# Patient Record
Sex: FEMALE | Race: WHITE | NOT HISPANIC OR LATINO | Employment: OTHER | URBAN - METROPOLITAN AREA
[De-identification: names, ages, dates, MRNs, and addresses within clinical notes are randomized per-mention and may not be internally consistent; named-entity substitution may affect disease eponyms.]

---

## 2023-06-02 ENCOUNTER — OFFICE VISIT (OUTPATIENT)
Dept: PRIMARY CARE | Facility: CLINIC | Age: 78
End: 2023-06-02
Payer: MEDICARE

## 2023-06-02 ENCOUNTER — LAB (OUTPATIENT)
Dept: LAB | Facility: LAB | Age: 78
End: 2023-06-02
Payer: MEDICARE

## 2023-06-02 VITALS
WEIGHT: 185.8 LBS | TEMPERATURE: 98 F | BODY MASS INDEX: 31.89 KG/M2 | DIASTOLIC BLOOD PRESSURE: 82 MMHG | SYSTOLIC BLOOD PRESSURE: 132 MMHG | HEART RATE: 76 BPM

## 2023-06-02 DIAGNOSIS — R60.0 BILATERAL LOWER EXTREMITY EDEMA: Primary | ICD-10-CM

## 2023-06-02 DIAGNOSIS — R60.0 BILATERAL LOWER EXTREMITY EDEMA: ICD-10-CM

## 2023-06-02 PROBLEM — N39.0 RECURRENT URINARY TRACT INFECTION: Status: ACTIVE | Noted: 2023-06-02

## 2023-06-02 PROBLEM — R26.89 BALANCE PROBLEMS: Status: ACTIVE | Noted: 2023-06-02

## 2023-06-02 PROBLEM — G89.29 CHRONIC MIDLINE LOW BACK PAIN WITHOUT SCIATICA: Status: ACTIVE | Noted: 2023-06-02

## 2023-06-02 PROBLEM — N34.2 ATROPHIC URETHRITIS: Status: ACTIVE | Noted: 2023-06-02

## 2023-06-02 PROBLEM — H90.3 ASYMMETRICAL SENSORINEURAL HEARING LOSS: Status: ACTIVE | Noted: 2023-06-02

## 2023-06-02 PROBLEM — N32.81 OAB (OVERACTIVE BLADDER): Status: ACTIVE | Noted: 2023-06-02

## 2023-06-02 PROBLEM — E03.9 HYPOTHYROIDISM (ACQUIRED): Status: ACTIVE | Noted: 2023-06-02

## 2023-06-02 PROBLEM — M85.80 OSTEOPENIA: Status: ACTIVE | Noted: 2023-06-02

## 2023-06-02 PROBLEM — E78.5 HYPERLIPIDEMIA: Status: ACTIVE | Noted: 2023-06-02

## 2023-06-02 PROBLEM — K21.00 GASTROESOPHAGEAL REFLUX DISEASE WITH ESOPHAGITIS: Status: ACTIVE | Noted: 2023-06-02

## 2023-06-02 PROBLEM — M54.50 CHRONIC MIDLINE LOW BACK PAIN WITHOUT SCIATICA: Status: ACTIVE | Noted: 2023-06-02

## 2023-06-02 PROBLEM — N95.2 VAGINAL ATROPHY: Status: ACTIVE | Noted: 2023-06-02

## 2023-06-02 PROBLEM — F32.A CHRONIC DEPRESSION: Status: ACTIVE | Noted: 2023-06-02

## 2023-06-02 LAB
ALANINE AMINOTRANSFERASE (SGPT) (U/L) IN SER/PLAS: 11 U/L (ref 7–45)
ALBUMIN (G/DL) IN SER/PLAS: 3.8 G/DL (ref 3.4–5)
ALKALINE PHOSPHATASE (U/L) IN SER/PLAS: 75 U/L (ref 33–136)
ANION GAP IN SER/PLAS: 12 MMOL/L (ref 10–20)
ASPARTATE AMINOTRANSFERASE (SGOT) (U/L) IN SER/PLAS: 12 U/L (ref 9–39)
BILIRUBIN TOTAL (MG/DL) IN SER/PLAS: 0.5 MG/DL (ref 0–1.2)
CALCIUM (MG/DL) IN SER/PLAS: 8.7 MG/DL (ref 8.6–10.3)
CARBON DIOXIDE, TOTAL (MMOL/L) IN SER/PLAS: 25 MMOL/L (ref 21–32)
CHLORIDE (MMOL/L) IN SER/PLAS: 107 MMOL/L (ref 98–107)
CREATININE (MG/DL) IN SER/PLAS: 0.93 MG/DL (ref 0.5–1.05)
ERYTHROCYTE DISTRIBUTION WIDTH (RATIO) BY AUTOMATED COUNT: 13.2 % (ref 11.5–14.5)
ERYTHROCYTE MEAN CORPUSCULAR HEMOGLOBIN CONCENTRATION (G/DL) BY AUTOMATED: 31.8 G/DL (ref 32–36)
ERYTHROCYTE MEAN CORPUSCULAR VOLUME (FL) BY AUTOMATED COUNT: 93 FL (ref 80–100)
ERYTHROCYTES (10*6/UL) IN BLOOD BY AUTOMATED COUNT: 4.12 X10E12/L (ref 4–5.2)
GFR FEMALE: 62 ML/MIN/1.73M2
GLUCOSE (MG/DL) IN SER/PLAS: 115 MG/DL (ref 74–99)
HEMATOCRIT (%) IN BLOOD BY AUTOMATED COUNT: 38.4 % (ref 36–46)
HEMOGLOBIN (G/DL) IN BLOOD: 12.2 G/DL (ref 12–16)
LEUKOCYTES (10*3/UL) IN BLOOD BY AUTOMATED COUNT: 7.8 X10E9/L (ref 4.4–11.3)
PLATELETS (10*3/UL) IN BLOOD AUTOMATED COUNT: 278 X10E9/L (ref 150–450)
POTASSIUM (MMOL/L) IN SER/PLAS: 3.7 MMOL/L (ref 3.5–5.3)
PROTEIN TOTAL: 6.3 G/DL (ref 6.4–8.2)
SODIUM (MMOL/L) IN SER/PLAS: 140 MMOL/L (ref 136–145)
THYROTROPIN (MIU/L) IN SER/PLAS BY DETECTION LIMIT <= 0.05 MIU/L: 1.85 MIU/L (ref 0.44–3.98)
UREA NITROGEN (MG/DL) IN SER/PLAS: 15 MG/DL (ref 6–23)

## 2023-06-02 PROCEDURE — 1160F RVW MEDS BY RX/DR IN RCRD: CPT | Performed by: NURSE PRACTITIONER

## 2023-06-02 PROCEDURE — 99214 OFFICE O/P EST MOD 30 MIN: CPT | Performed by: NURSE PRACTITIONER

## 2023-06-02 PROCEDURE — 1159F MED LIST DOCD IN RCRD: CPT | Performed by: NURSE PRACTITIONER

## 2023-06-02 PROCEDURE — 36415 COLL VENOUS BLD VENIPUNCTURE: CPT

## 2023-06-02 PROCEDURE — 80053 COMPREHEN METABOLIC PANEL: CPT

## 2023-06-02 PROCEDURE — 1036F TOBACCO NON-USER: CPT | Performed by: NURSE PRACTITIONER

## 2023-06-02 PROCEDURE — 84443 ASSAY THYROID STIM HORMONE: CPT

## 2023-06-02 PROCEDURE — 85027 COMPLETE CBC AUTOMATED: CPT

## 2023-06-02 RX ORDER — ESTRADIOL 0.1 MG/G
CREAM VAGINAL
COMMUNITY

## 2023-06-02 RX ORDER — FLUOXETINE HYDROCHLORIDE 20 MG/1
40 CAPSULE ORAL DAILY
COMMUNITY
End: 2024-02-22

## 2023-06-02 RX ORDER — SOLIFENACIN SUCCINATE 5 MG/1
5 TABLET, FILM COATED ORAL DAILY
COMMUNITY
End: 2023-11-29 | Stop reason: SDUPTHER

## 2023-06-02 RX ORDER — SULFAMETHOXAZOLE AND TRIMETHOPRIM 400; 80 MG/1; MG/1
1 TABLET ORAL DAILY
COMMUNITY
End: 2024-03-28

## 2023-06-02 RX ORDER — LEVOTHYROXINE SODIUM 50 UG/1
50 TABLET ORAL DAILY
COMMUNITY
End: 2024-01-22

## 2023-06-02 RX ORDER — FLUTICASONE PROPIONATE 50 MCG
SPRAY, SUSPENSION (ML) NASAL
COMMUNITY

## 2023-06-02 RX ORDER — ATORVASTATIN CALCIUM 20 MG/1
20 TABLET, FILM COATED ORAL NIGHTLY
COMMUNITY
End: 2023-07-13

## 2023-06-02 RX ORDER — ACETAMINOPHEN 500 MG
1 TABLET ORAL DAILY
COMMUNITY
Start: 2014-03-31

## 2023-06-02 RX ORDER — ALENDRONATE SODIUM 70 MG/1
70 TABLET ORAL
COMMUNITY
End: 2023-08-11

## 2023-06-02 RX ORDER — MINERAL OIL
ENEMA (ML) RECTAL
COMMUNITY
Start: 2015-08-17

## 2023-06-02 RX ORDER — LANOLIN ALCOHOL/MO/W.PET/CERES
CREAM (GRAM) TOPICAL
COMMUNITY
Start: 2019-09-06

## 2023-06-02 RX ORDER — DEXLANSOPRAZOLE 60 MG/1
60 CAPSULE, DELAYED RELEASE ORAL 3 TIMES WEEKLY
COMMUNITY
End: 2024-03-25 | Stop reason: SDUPTHER

## 2023-06-02 RX ORDER — GLUCOSAM/CHON-MSM1/C/MANG/BOSW 750-644 MG
1 TABLET ORAL DAILY
COMMUNITY
Start: 2015-11-18

## 2023-06-02 RX ORDER — ERYTHROMYCIN 5 MG/G
OINTMENT OPHTHALMIC
COMMUNITY
Start: 2021-12-03

## 2023-06-02 ASSESSMENT — ENCOUNTER SYMPTOMS
ABDOMINAL PAIN: 0
FEVER: 0
FATIGUE: 0
WHEEZING: 0
COUGH: 1
SHORTNESS OF BREATH: 0
ABDOMINAL DISTENTION: 0
PALPITATIONS: 0
CHILLS: 0
CHEST TIGHTNESS: 0

## 2023-06-02 NOTE — ASSESSMENT & PLAN NOTE
Suspect due to venous insufficiency.  Check labs today.  Recommended wearing compression socks.  Continue to elevate throughout the day.  Continue to hydrate with water and focus on a low-sodium diet.  Given slow increase in weight, an echo was ordered today.

## 2023-06-02 NOTE — PATIENT INSTRUCTIONS
Continue to focus on a low sodium diet.  Be sure to stay hydrated with water.  Obtain the blood work as ordered today.  Elevate legs for 30-60 mins about 4 times a day.   Echo ordered.  If weight increases 2 lbs in a day or 5 lbs in a week, notify the office.

## 2023-06-02 NOTE — PROGRESS NOTES
Subjective   Patient ID: Valery Kiser is a 77 y.o. female who presents for Leg Swelling (About a month but the last week it has gotten worse. Swelling is worse at the end of the day.).    HPI  She presents to the office today with her  for evaluation of edema to BLE. This has been present for about a month but worse in the past week. This seems to be worse at the end of the day- better in the AM.  Elevates during the day several times.  It is only affecting the ankles and feet.  No chest pain, SOB or palpitations.  No SOB when laying flat at night. No CASAS.  (+) cough at night due to PND.  Appetite is good.  No early satiety.  No bloating.  Overall she is feeling well.  No fever or chills.   She reports she drinks plenty of water and focus on a low-sodium diet.  Has had laser surgery to varicose veins in the past both legs (2008).  Had similar swelling prior to having the procedure.    Review of Systems   Constitutional:  Negative for chills, fatigue and fever.   HENT:  Positive for postnasal drip.    Respiratory:  Positive for cough. Negative for chest tightness, shortness of breath and wheezing.    Cardiovascular:  Positive for leg swelling. Negative for chest pain and palpitations.   Gastrointestinal:  Negative for abdominal distention and abdominal pain.       Objective   /82   Pulse 76   Temp 36.7 °C (98 °F)   Wt 84.3 kg (185 lb 12.8 oz)   BMI 31.89 kg/m²     Physical Exam  Constitutional:       General: She is not in acute distress.     Appearance: Normal appearance. She is not toxic-appearing.   Eyes:      Extraocular Movements: Extraocular movements intact.      Conjunctiva/sclera: Conjunctivae normal.      Pupils: Pupils are equal, round, and reactive to light.   Cardiovascular:      Rate and Rhythm: Normal rate and regular rhythm.      Pulses: Normal pulses.      Heart sounds: Normal heart sounds, S1 normal and S2 normal. No murmur heard.  Pulmonary:      Effort: Pulmonary effort is  normal. No respiratory distress.      Breath sounds: Normal breath sounds.   Abdominal:      General: Bowel sounds are normal.      Palpations: Abdomen is soft.      Tenderness: There is no abdominal tenderness.   Musculoskeletal:      Right lower le+ Pitting Edema present.      Left lower le+ Pitting Edema present.   Lymphadenopathy:      Cervical: No cervical adenopathy.   Neurological:      Mental Status: She is alert and oriented to person, place, and time.   Psychiatric:         Attention and Perception: Attention normal.         Mood and Affect: Mood and affect normal.         Behavior: Behavior normal. Behavior is cooperative.         Thought Content: Thought content normal.         Cognition and Memory: Cognition normal.         Judgment: Judgment normal.         Assessment/Plan   Problem List Items Addressed This Visit          Musculoskeletal    Bilateral lower extremity edema - Primary     Check labs today.  Recommended wearing compression socks.  Continue to elevate throughout the day.  Continue to hydrate with water and focus on a low-sodium diet.  Given slow increase in weight, an echo was ordered today.         Relevant Orders    Comprehensive Metabolic Panel    CBC    TSH with reflex to Free T4 if abnormal    Transthoracic Echo (TTE) Complete            It has been a pleasure seeing you today!

## 2023-06-05 ENCOUNTER — TELEPHONE (OUTPATIENT)
Dept: PRIMARY CARE | Facility: CLINIC | Age: 78
End: 2023-06-05
Payer: MEDICARE

## 2023-06-05 NOTE — TELEPHONE ENCOUNTER
----- Message from HALLIE Balderrama sent at 6/3/2023  9:21 AM EDT -----  Please let her know her labs looked good. Has there been any improvement in the swelling of her lower legs?

## 2023-06-05 NOTE — TELEPHONE ENCOUNTER
Per HIPAA, relayed result to her    Luis. He says that the support hose seem to help bc when she doesn't wear them they are bad. He said slight improvement overall tho. She has a follow up with Dr. Elise 6/15.

## 2023-06-07 NOTE — TELEPHONE ENCOUNTER
Talked to patient says the stockings are helping and there is very little swelling left. Will see dr Mccollum 6/15 as scheduled.

## 2023-06-15 ENCOUNTER — OFFICE VISIT (OUTPATIENT)
Dept: PRIMARY CARE | Facility: CLINIC | Age: 78
End: 2023-06-15
Payer: MEDICARE

## 2023-06-15 VITALS
OXYGEN SATURATION: 95 % | TEMPERATURE: 97.8 F | SYSTOLIC BLOOD PRESSURE: 130 MMHG | BODY MASS INDEX: 32.1 KG/M2 | DIASTOLIC BLOOD PRESSURE: 80 MMHG | WEIGHT: 187 LBS | HEART RATE: 104 BPM

## 2023-06-15 DIAGNOSIS — D22.9 CHANGE IN MOLE: Primary | ICD-10-CM

## 2023-06-15 PROCEDURE — 1036F TOBACCO NON-USER: CPT | Performed by: STUDENT IN AN ORGANIZED HEALTH CARE EDUCATION/TRAINING PROGRAM

## 2023-06-15 PROCEDURE — 99213 OFFICE O/P EST LOW 20 MIN: CPT | Performed by: STUDENT IN AN ORGANIZED HEALTH CARE EDUCATION/TRAINING PROGRAM

## 2023-06-15 PROCEDURE — 1159F MED LIST DOCD IN RCRD: CPT | Performed by: STUDENT IN AN ORGANIZED HEALTH CARE EDUCATION/TRAINING PROGRAM

## 2023-06-15 PROCEDURE — 1160F RVW MEDS BY RX/DR IN RCRD: CPT | Performed by: STUDENT IN AN ORGANIZED HEALTH CARE EDUCATION/TRAINING PROGRAM

## 2023-06-15 NOTE — PROGRESS NOTES
Subjective   Patient ID: Valery Kiser is a 77 y.o. female who presents for Nevus.    HPI   Has a lot of moles on her back. Has an SK on L shoulder. Needs referral to derm. Doesn't notice any change.    She has balance issues. Has done PT in the past. Doesn't feel it is getting or worse right now. Takes a class that may help with balance. No falls. 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     Review of Systems   Constitutional:  Negative for chills, fatigue and fever.   Respiratory:  Negative for cough, shortness of breath and wheezing.    Cardiovascular:  Negative for chest pain, palpitations and leg swelling.   Gastrointestinal:  Negative for abdominal pain, constipation, diarrhea and nausea.   Genitourinary:  Negative for dysuria, frequency, hematuria and urgency.   Neurological:  Negative for dizziness, numbness and headaches.   Psychiatric/Behavioral:  Negative for dysphoric mood. The patient is not nervous/anxious.        Objective   /80 (BP Location: Left arm, Patient Position: Sitting, BP Cuff Size: Adult)   Pulse 104   Temp 36.6 °C (97.8 °F) (Skin)   Wt 84.8 kg (187 lb)   SpO2 95%   BMI 32.10 kg/m²     Physical Exam  Constitutional:       Appearance: Normal appearance.   HENT:      Head: Normocephalic and atraumatic.   Eyes:      Extraocular Movements: Extraocular movements intact.      Pupils: Pupils are equal, round, and reactive to light.   Cardiovascular:      Rate and Rhythm: Normal rate and regular rhythm.      Heart sounds: Normal heart sounds. No murmur heard.  Pulmonary:      Effort: Pulmonary effort is normal.      Breath sounds: Normal breath sounds. No wheezing.   Abdominal:      General: Bowel sounds are normal.      Palpations: Abdomen is soft.      Tenderness: There is no abdominal tenderness. There is no guarding.   Musculoskeletal:         General: Normal range of motion.   Skin:     General: Skin is warm and dry.             Comments: Multiple seborrheic keratoses on back    Neurological:      General: No focal deficit present.      Mental Status: She is alert and oriented to person, place, and time.   Psychiatric:         Mood and Affect: Mood normal.         Behavior: Behavior normal.         Assessment/Plan   Problem List Items Addressed This Visit    None  Visit Diagnoses       Change in mole    -  Primary    Relevant Orders    Referral to Dermatology          Referral to dermatology for further evaluation.  She is unsure if anything on her body is changing as she cannot see it.  I have asked that if she feels that her balance is getting worse that she let me know and we will get her back into physical therapy       Patient understands and agrees with treatment plan    Carmela Elise, DO   06/17/23

## 2023-06-17 ASSESSMENT — ENCOUNTER SYMPTOMS
NUMBNESS: 0
SHORTNESS OF BREATH: 0
CHILLS: 0
CONSTIPATION: 0
NAUSEA: 0
NERVOUS/ANXIOUS: 0
HEADACHES: 0
WHEEZING: 0
PALPITATIONS: 0
DIZZINESS: 0
DIARRHEA: 0
DYSPHORIC MOOD: 0
FREQUENCY: 0
FEVER: 0
ABDOMINAL PAIN: 0
HEMATURIA: 0
FATIGUE: 0
DYSURIA: 0
COUGH: 0

## 2023-07-10 DIAGNOSIS — E78.5 HYPERLIPIDEMIA, UNSPECIFIED: ICD-10-CM

## 2023-07-13 RX ORDER — ATORVASTATIN CALCIUM 20 MG/1
TABLET, FILM COATED ORAL
Qty: 90 TABLET | Refills: 0 | Status: SHIPPED | OUTPATIENT
Start: 2023-07-13 | End: 2023-11-06

## 2023-08-08 DIAGNOSIS — M85.80 OTHER SPECIFIED DISORDERS OF BONE DENSITY AND STRUCTURE, UNSPECIFIED SITE: ICD-10-CM

## 2023-08-11 RX ORDER — ALENDRONATE SODIUM 70 MG/1
70 TABLET ORAL
Qty: 12 TABLET | Refills: 3 | Status: SHIPPED | OUTPATIENT
Start: 2023-08-11

## 2023-09-11 ENCOUNTER — OFFICE VISIT (OUTPATIENT)
Dept: PRIMARY CARE | Facility: CLINIC | Age: 78
End: 2023-09-11
Payer: MEDICARE

## 2023-09-11 VITALS
BODY MASS INDEX: 29.37 KG/M2 | HEART RATE: 79 BPM | DIASTOLIC BLOOD PRESSURE: 88 MMHG | WEIGHT: 172 LBS | OXYGEN SATURATION: 95 % | SYSTOLIC BLOOD PRESSURE: 140 MMHG | TEMPERATURE: 97.7 F | HEIGHT: 64 IN

## 2023-09-11 DIAGNOSIS — R26.89 BALANCE PROBLEMS: ICD-10-CM

## 2023-09-11 DIAGNOSIS — M85.80 OSTEOPENIA, UNSPECIFIED LOCATION: ICD-10-CM

## 2023-09-11 DIAGNOSIS — E78.2 MIXED HYPERLIPIDEMIA: ICD-10-CM

## 2023-09-11 DIAGNOSIS — F41.9 ANXIETY AND DEPRESSION: ICD-10-CM

## 2023-09-11 DIAGNOSIS — Z00.00 MEDICARE ANNUAL WELLNESS VISIT, SUBSEQUENT: Primary | ICD-10-CM

## 2023-09-11 DIAGNOSIS — M54.50 CHRONIC MIDLINE LOW BACK PAIN WITHOUT SCIATICA: ICD-10-CM

## 2023-09-11 DIAGNOSIS — F32.A ANXIETY AND DEPRESSION: ICD-10-CM

## 2023-09-11 DIAGNOSIS — Z78.0 ASYMPTOMATIC POSTMENOPAUSAL STATE: ICD-10-CM

## 2023-09-11 DIAGNOSIS — Z23 ENCOUNTER FOR IMMUNIZATION: ICD-10-CM

## 2023-09-11 DIAGNOSIS — G89.29 CHRONIC MIDLINE LOW BACK PAIN WITHOUT SCIATICA: ICD-10-CM

## 2023-09-11 DIAGNOSIS — Z00.00 HEALTH MAINTENANCE EXAMINATION: ICD-10-CM

## 2023-09-11 DIAGNOSIS — L30.9 ECZEMA, UNSPECIFIED TYPE: ICD-10-CM

## 2023-09-11 PROCEDURE — 1170F FXNL STATUS ASSESSED: CPT | Performed by: STUDENT IN AN ORGANIZED HEALTH CARE EDUCATION/TRAINING PROGRAM

## 2023-09-11 PROCEDURE — 99214 OFFICE O/P EST MOD 30 MIN: CPT | Performed by: STUDENT IN AN ORGANIZED HEALTH CARE EDUCATION/TRAINING PROGRAM

## 2023-09-11 PROCEDURE — 90662 IIV NO PRSV INCREASED AG IM: CPT | Performed by: STUDENT IN AN ORGANIZED HEALTH CARE EDUCATION/TRAINING PROGRAM

## 2023-09-11 PROCEDURE — 1160F RVW MEDS BY RX/DR IN RCRD: CPT | Performed by: STUDENT IN AN ORGANIZED HEALTH CARE EDUCATION/TRAINING PROGRAM

## 2023-09-11 PROCEDURE — 1036F TOBACCO NON-USER: CPT | Performed by: STUDENT IN AN ORGANIZED HEALTH CARE EDUCATION/TRAINING PROGRAM

## 2023-09-11 PROCEDURE — 1126F AMNT PAIN NOTED NONE PRSNT: CPT | Performed by: STUDENT IN AN ORGANIZED HEALTH CARE EDUCATION/TRAINING PROGRAM

## 2023-09-11 PROCEDURE — 1159F MED LIST DOCD IN RCRD: CPT | Performed by: STUDENT IN AN ORGANIZED HEALTH CARE EDUCATION/TRAINING PROGRAM

## 2023-09-11 PROCEDURE — G0439 PPPS, SUBSEQ VISIT: HCPCS | Performed by: STUDENT IN AN ORGANIZED HEALTH CARE EDUCATION/TRAINING PROGRAM

## 2023-09-11 PROCEDURE — G0008 ADMIN INFLUENZA VIRUS VAC: HCPCS | Performed by: STUDENT IN AN ORGANIZED HEALTH CARE EDUCATION/TRAINING PROGRAM

## 2023-09-11 PROCEDURE — 99397 PER PM REEVAL EST PAT 65+ YR: CPT | Performed by: STUDENT IN AN ORGANIZED HEALTH CARE EDUCATION/TRAINING PROGRAM

## 2023-09-11 RX ORDER — BUSPIRONE HYDROCHLORIDE 5 MG/1
5 TABLET ORAL 2 TIMES DAILY
Qty: 60 TABLET | Refills: 3 | Status: SHIPPED | OUTPATIENT
Start: 2023-09-11 | End: 2024-01-13 | Stop reason: SDUPTHER

## 2023-09-11 RX ORDER — TRIAMCINOLONE ACETONIDE 1 MG/G
OINTMENT TOPICAL 2 TIMES DAILY PRN
Qty: 30 G | Refills: 2 | Status: SHIPPED | OUTPATIENT
Start: 2023-09-11 | End: 2024-01-09

## 2023-09-11 ASSESSMENT — PATIENT HEALTH QUESTIONNAIRE - PHQ9
1. LITTLE INTEREST OR PLEASURE IN DOING THINGS: NOT AT ALL
2. FEELING DOWN, DEPRESSED OR HOPELESS: NOT AT ALL
SUM OF ALL RESPONSES TO PHQ9 QUESTIONS 1 AND 2: 0

## 2023-09-11 ASSESSMENT — ENCOUNTER SYMPTOMS
DYSPHORIC MOOD: 0
CHILLS: 0
FREQUENCY: 0
NAUSEA: 0
NUMBNESS: 0
NERVOUS/ANXIOUS: 0
ABDOMINAL PAIN: 0
HEMATURIA: 0
FEVER: 0
SHORTNESS OF BREATH: 0
PALPITATIONS: 0
FATIGUE: 0
COUGH: 0
CONSTIPATION: 0
HEADACHES: 0
DIARRHEA: 0
DYSURIA: 0
WHEEZING: 0
DIZZINESS: 0

## 2023-09-11 ASSESSMENT — ACTIVITIES OF DAILY LIVING (ADL)
BATHING: INDEPENDENT
MANAGING_FINANCES: INDEPENDENT
TAKING_MEDICATION: INDEPENDENT
DRESSING: INDEPENDENT
GROCERY_SHOPPING: INDEPENDENT
DOING_HOUSEWORK: INDEPENDENT

## 2023-09-11 NOTE — PROGRESS NOTES
Subjective   Reason for Visit: Valery Kiser is an 77 y.o. female here for a Medicare Wellness visit.        Reviewed all medications by prescribing practitioner or clinical pharmacist (such as prescriptions, OTCs, herbal therapies and supplements) and documented in the medical record.    HPI  Specialists seen by patient: Urology: [facility], dr Mari  -PT at [facility]  -Derm  -ENT: Hearing loss, Dr Johns  -GI: Dr Miranda, GERD  -OBGYN: Dr Salgado  -ophthalmo: Dr Mars for macular degeneration, injections    Last pap/cervical cancer screening: n/a  Last mammogram:  n/a  Hx of colon ca screening: n/a 2017  Hx of DXA: yes, will reorder  History of depression or anxiety:yes, prozac not overly well controlled  Immunizations: not up to date - needs tdap, and will get flu today  Diet: Follows a healthy diet  Exercise: Gets regular exercise   Alcohol abuse screen:   none   How many times in the past year 4 or more drinks in a day? 0  Lung cancer screening:   Smoking history: ex-smoker and quit 40 years ago  Drug use: No    Patient Self Assessment of Health Status  Patient Self Assessment: Good  Functional Ability/Level of Safety  Cognitive Impairment Observed: No cognitive impairment observed  Falls Home Safety Risk Factors  Home Safety Risk Factors: Loose rugs  Nutrition and Exercise  Current Diet: Well Balanced Diet, Heart Healthy Diet  Adequate Fluid Intake: Yes  Caffeine: Yes  Exercise Frequency: Regularly  ADL Screening  Hearing - Right Ear: Hearing aid  Hearing - Left Ear: Hearing aid  Bathing: Independent  Dressing: Independent  Walks in Home: Independent  IADL's  Managing Finances: Independent  Grocery Shopping: Independent  Taking Medication: Independent  Doing Housework: Independent      Has an rash intermittently for a few weeks. Usually on L arm but now it is on the R and the L is fine. Spray antihistamine makes it better. No new soaps, lotions, detergents. Only on the arms. Usually in the flexor crease of  "the elbow. Hasn't come in contact with anything. No new meds.     Needs referral to PT at [facility] for back and balance    Has never been on buspirone. Having worsening anxiety since moving to [facility] b/c of a big change. She worries. Doesn't feel overwhelmed currently.    Patient Care Team:  Carmela Elise DO as PCP - General  Scott Robbins MD as PCP - [employer] PCP     Review of Systems   Constitutional:  Negative for chills, fatigue and fever.   Respiratory:  Negative for cough, shortness of breath and wheezing.    Cardiovascular:  Negative for chest pain, palpitations and leg swelling.   Gastrointestinal:  Negative for abdominal pain, constipation, diarrhea and nausea.   Genitourinary:  Negative for dysuria, frequency, hematuria and urgency.   Neurological:  Negative for dizziness, numbness and headaches.   Psychiatric/Behavioral:  Negative for dysphoric mood. The patient is not nervous/anxious.        Objective   Vitals:  /88 (BP Location: Left arm, Patient Position: Sitting, BP Cuff Size: Adult)   Pulse 79   Temp 36.5 °C (97.7 °F) (Temporal)   Ht 1.626 m (5' 4\")   Wt 78 kg (172 lb)   SpO2 95%   BMI 29.52 kg/m²       Physical Exam  Constitutional:       General: She is not in acute distress.     Appearance: Normal appearance.   HENT:      Head: Normocephalic and atraumatic.      Right Ear: Tympanic membrane and ear canal normal.      Left Ear: Tympanic membrane and ear canal normal.      Mouth/Throat:      Mouth: Mucous membranes are moist.      Pharynx: No posterior oropharyngeal erythema.   Eyes:      Extraocular Movements: Extraocular movements intact.      Pupils: Pupils are equal, round, and reactive to light.   Cardiovascular:      Rate and Rhythm: Normal rate and regular rhythm.      Heart sounds: No murmur heard.  Pulmonary:      Effort: Pulmonary effort is normal. No respiratory distress.      Breath sounds: Normal breath sounds. No wheezing.   Abdominal: "      General: Bowel sounds are normal.      Palpations: Abdomen is soft.      Tenderness: There is no abdominal tenderness. There is no guarding.   Musculoskeletal:         General: Normal range of motion.      Cervical back: Neck supple.   Skin:     General: Skin is warm and dry.      Findings: Rash present. Rash is papular.          Neurological:      General: No focal deficit present.      Mental Status: She is alert and oriented to person, place, and time.   Psychiatric:         Mood and Affect: Mood normal.         Behavior: Behavior normal.         Assessment/Plan   Problem List Items Addressed This Visit       Hyperlipidemia    Relevant Orders    Lipid Panel    Comprehensive Metabolic Panel    Osteopenia    Balance problems    Relevant Orders    Referral to Physical Therapy    Chronic midline low back pain without sciatica    Relevant Orders    Referral to Physical Therapy     Other Visit Diagnoses       Medicare annual wellness visit, subsequent    -  Primary    Health maintenance examination        Encounter for immunization        Relevant Orders    Flu vaccine, quadrivalent, high-dose, preservative free, age 65y+ (FLUZONE) (Completed)    Eczema, unspecified type        Relevant Medications    triamcinolone (Kenalog) 0.1 % ointment    Asymptomatic postmenopausal state        Relevant Orders    XR DEXA bone density    Anxiety and depression        Relevant Medications    busPIRone (Buspar) 5 mg tablet          We will obtain fasting blood work.  Results will be communicated to the patient via [employer] or a letter.   We reviewed appropriate preventative health screening guidelines. The patient is not up-to-date on vaccinations, recommended tetanus vaccine and influenza vaccine.  Flu vaccine given today.  We discussed regular aerobic exercise. We discussed proper nutrition and weight control.    Triamcinolone sent in for her rash    We will start 5 mg of BuSpar twice daily in addition to her 40 mg of Prozac  and follow-up in a few months to see how she is doing.    Referral to physical therapy for her back pain and balance issues.

## 2023-10-03 ENCOUNTER — APPOINTMENT (OUTPATIENT)
Dept: PRIMARY CARE | Facility: CLINIC | Age: 78
End: 2023-10-03
Payer: MEDICARE

## 2023-10-03 DIAGNOSIS — F41.9 ANXIETY AND DEPRESSION: ICD-10-CM

## 2023-10-03 DIAGNOSIS — F32.A ANXIETY AND DEPRESSION: ICD-10-CM

## 2023-10-04 RX ORDER — BUSPIRONE HYDROCHLORIDE 5 MG/1
5 TABLET ORAL 2 TIMES DAILY
Qty: 60 TABLET | Refills: 3 | OUTPATIENT
Start: 2023-10-04

## 2023-10-06 DIAGNOSIS — E78.5 HYPERLIPIDEMIA, UNSPECIFIED: ICD-10-CM

## 2023-10-11 RX ORDER — ATORVASTATIN CALCIUM 20 MG/1
TABLET, FILM COATED ORAL
Qty: 90 TABLET | Refills: 0 | OUTPATIENT
Start: 2023-10-11

## 2023-10-12 ENCOUNTER — ANCILLARY PROCEDURE (OUTPATIENT)
Dept: RADIOLOGY | Facility: CLINIC | Age: 78
End: 2023-10-12
Payer: MEDICARE

## 2023-10-12 ENCOUNTER — LAB (OUTPATIENT)
Dept: LAB | Facility: LAB | Age: 78
End: 2023-10-12
Payer: MEDICARE

## 2023-10-12 DIAGNOSIS — E78.2 MIXED HYPERLIPIDEMIA: ICD-10-CM

## 2023-10-12 DIAGNOSIS — Z78.0 ASYMPTOMATIC POSTMENOPAUSAL STATE: ICD-10-CM

## 2023-10-12 LAB
ALBUMIN SERPL BCP-MCNC: 3.8 G/DL (ref 3.4–5)
ALP SERPL-CCNC: 74 U/L (ref 33–136)
ALT SERPL W P-5'-P-CCNC: 13 U/L (ref 7–45)
ANION GAP SERPL CALC-SCNC: 10 MMOL/L (ref 10–20)
AST SERPL W P-5'-P-CCNC: 11 U/L (ref 9–39)
BILIRUB SERPL-MCNC: 0.7 MG/DL (ref 0–1.2)
BUN SERPL-MCNC: 14 MG/DL (ref 6–23)
CALCIUM SERPL-MCNC: 8.8 MG/DL (ref 8.6–10.3)
CHLORIDE SERPL-SCNC: 107 MMOL/L (ref 98–107)
CHOLEST SERPL-MCNC: 162 MG/DL (ref 0–199)
CHOLESTEROL/HDL RATIO: 3.8
CO2 SERPL-SCNC: 28 MMOL/L (ref 21–32)
CREAT SERPL-MCNC: 0.96 MG/DL (ref 0.5–1.05)
GFR SERPL CREATININE-BSD FRML MDRD: 60 ML/MIN/1.73M*2
GLUCOSE SERPL-MCNC: 91 MG/DL (ref 74–99)
HDLC SERPL-MCNC: 42.9 MG/DL
LDLC SERPL CALC-MCNC: 101 MG/DL
NON HDL CHOLESTEROL: 119 MG/DL (ref 0–149)
POTASSIUM SERPL-SCNC: 4.2 MMOL/L (ref 3.5–5.3)
PROT SERPL-MCNC: 6 G/DL (ref 6.4–8.2)
SODIUM SERPL-SCNC: 141 MMOL/L (ref 136–145)
TRIGL SERPL-MCNC: 93 MG/DL (ref 0–149)
VLDL: 19 MG/DL (ref 0–40)

## 2023-10-12 PROCEDURE — 77080 DXA BONE DENSITY AXIAL: CPT | Performed by: RADIOLOGY

## 2023-10-12 PROCEDURE — 36415 COLL VENOUS BLD VENIPUNCTURE: CPT

## 2023-10-12 PROCEDURE — 80053 COMPREHEN METABOLIC PANEL: CPT

## 2023-10-12 PROCEDURE — 77080 DXA BONE DENSITY AXIAL: CPT

## 2023-10-12 PROCEDURE — 80061 LIPID PANEL: CPT

## 2023-11-03 DIAGNOSIS — E78.5 HYPERLIPIDEMIA, UNSPECIFIED: ICD-10-CM

## 2023-11-06 RX ORDER — ATORVASTATIN CALCIUM 20 MG/1
TABLET, FILM COATED ORAL
Qty: 90 TABLET | Refills: 0 | Status: SHIPPED | OUTPATIENT
Start: 2023-11-06 | End: 2024-02-06

## 2023-11-27 ENCOUNTER — TELEPHONE (OUTPATIENT)
Dept: OBSTETRICS AND GYNECOLOGY | Facility: CLINIC | Age: 78
End: 2023-11-27
Payer: MEDICARE

## 2023-11-29 ENCOUNTER — TELEPHONE (OUTPATIENT)
Dept: OBSTETRICS AND GYNECOLOGY | Facility: CLINIC | Age: 78
End: 2023-11-29
Payer: MEDICARE

## 2023-11-29 DIAGNOSIS — N32.81 OAB (OVERACTIVE BLADDER): Primary | ICD-10-CM

## 2023-11-29 RX ORDER — SOLIFENACIN SUCCINATE 5 MG/1
5 TABLET, FILM COATED ORAL DAILY
Qty: 90 TABLET | Refills: 1 | Status: SHIPPED | OUTPATIENT
Start: 2023-11-29 | End: 2024-05-22

## 2023-11-29 NOTE — TELEPHONE ENCOUNTER
Request received for   Requested Prescriptions     Pending Prescriptions Disp Refills    solifenacin (VESIcare) 5 mg tablet 90 tablet 1     Sig: Take 1 tablet (5 mg) by mouth once daily.       Valery Kiser was last seen 7/26/2023

## 2023-12-07 ENCOUNTER — APPOINTMENT (OUTPATIENT)
Dept: GASTROENTEROLOGY | Facility: CLINIC | Age: 78
End: 2023-12-07
Payer: MEDICARE

## 2023-12-21 ENCOUNTER — APPOINTMENT (OUTPATIENT)
Dept: PRIMARY CARE | Facility: CLINIC | Age: 78
End: 2023-12-21
Payer: MEDICARE

## 2024-01-04 ENCOUNTER — OFFICE VISIT (OUTPATIENT)
Dept: GASTROENTEROLOGY | Facility: CLINIC | Age: 79
End: 2024-01-04
Payer: MEDICARE

## 2024-01-04 VITALS — HEIGHT: 64 IN | HEART RATE: 72 BPM | BODY MASS INDEX: 29.02 KG/M2 | OXYGEN SATURATION: 95 % | WEIGHT: 170 LBS

## 2024-01-04 DIAGNOSIS — K21.00 GASTROESOPHAGEAL REFLUX DISEASE WITH ESOPHAGITIS WITHOUT HEMORRHAGE: Primary | ICD-10-CM

## 2024-01-04 PROCEDURE — 1126F AMNT PAIN NOTED NONE PRSNT: CPT | Performed by: INTERNAL MEDICINE

## 2024-01-04 PROCEDURE — 1036F TOBACCO NON-USER: CPT | Performed by: INTERNAL MEDICINE

## 2024-01-04 PROCEDURE — 1159F MED LIST DOCD IN RCRD: CPT | Performed by: INTERNAL MEDICINE

## 2024-01-04 PROCEDURE — 99214 OFFICE O/P EST MOD 30 MIN: CPT | Performed by: INTERNAL MEDICINE

## 2024-01-04 NOTE — PROGRESS NOTES
"Subjective     History of Present Illness:   Valery Kiser is a 78 y.o. female with PMHx of reflux who presents to GI clinic for follow up.  \"That medicine is still working\" - rare heartburn, relieved by antacid - has good appetite, no dysphagia, no odynophagia - occasional episodes of \"choking\" when she eats too quickly - \"everything is stable with me\"    Has reduced coffee intake - now 1 C daily - still drinks Diet Coke or Dr. Pepper daily - eating less chocolate - less snacking at bedtime     Voluntary 20# weight loss    On Dexlansoprazole 60 mg 3 per week      Allergies  Allergies   Allergen Reactions    Latex Other       Medications       Objective   Visit Vitals  Pulse 72          132/80    Lungs clear  CV  rrr, no mrg  Abd - soft nontender       Lab Results   Component Value Date    WBC 7.8 06/02/2023    WBC 9.1 09/14/2022    WBC 8.9 08/23/2021    HGB 12.2 06/02/2023    HGB 13.1 09/14/2022    HGB 14.0 08/23/2021    HCT 38.4 06/02/2023    HCT 40.3 09/14/2022    HCT 43.5 08/23/2021     06/02/2023     09/14/2022     08/23/2021     Lab Results   Component Value Date     10/12/2023     06/02/2023     09/14/2022    K 4.2 10/12/2023    K 3.7 06/02/2023    K 4.3 09/14/2022     10/12/2023     06/02/2023     09/14/2022    CO2 28 10/12/2023    CO2 25 06/02/2023    CO2 27 09/14/2022    BUN 14 10/12/2023    BUN 15 06/02/2023    BUN 14 09/14/2022    CREATININE 0.96 10/12/2023    CREATININE 0.93 06/02/2023    CREATININE 0.98 09/14/2022    CALCIUM 8.8 10/12/2023    CALCIUM 8.7 06/02/2023    CALCIUM 8.9 09/14/2022    PROT 6.0 (L) 10/12/2023    PROT 6.3 (L) 06/02/2023    PROT 6.9 09/14/2022    BILITOT 0.7 10/12/2023    BILITOT 0.5 06/02/2023    BILITOT 0.8 09/14/2022    ALKPHOS 74 10/12/2023    ALKPHOS 75 06/02/2023    ALKPHOS 72 09/14/2022    ALT 13 10/12/2023    ALT 11 06/02/2023    ALT 11 09/14/2022    AST 11 10/12/2023    AST 12 06/02/2023    AST 11 09/14/2022    " GLUCOSE 91 10/12/2023    GLUCOSE 115 (H) 06/02/2023    GLUCOSE 98 09/14/2022    CHOL 162 10/12/2023    CHOL 197 09/14/2022    CHOL 197 08/23/2021    LDLF 122 (H) 09/14/2022    LDLF 133 (H) 08/23/2021    LDLF 240 (H) 07/07/2020    CHHDL 3.8 10/12/2023    CHHDL 3.7 09/14/2022    CHHDL 4.4 08/23/2021              Valery Kiser is a 78 y.o. female for follow up of reflux - under good control on reduced dose of dexlansoprazole (60 mg 3/week) - will continue, call if symptoms increase, return in one year       Luis Fernando Miranda MD

## 2024-01-10 DIAGNOSIS — F41.9 ANXIETY AND DEPRESSION: ICD-10-CM

## 2024-01-10 DIAGNOSIS — F32.A ANXIETY AND DEPRESSION: ICD-10-CM

## 2024-01-13 RX ORDER — BUSPIRONE HYDROCHLORIDE 5 MG/1
5 TABLET ORAL 2 TIMES DAILY
Qty: 180 TABLET | Refills: 0 | Status: SHIPPED | OUTPATIENT
Start: 2024-01-13 | End: 2024-03-21 | Stop reason: SDUPTHER

## 2024-01-17 ENCOUNTER — LAB (OUTPATIENT)
Dept: LAB | Facility: LAB | Age: 79
End: 2024-01-17
Payer: MEDICARE

## 2024-01-17 ENCOUNTER — TELEPHONE (OUTPATIENT)
Dept: OBSTETRICS AND GYNECOLOGY | Facility: CLINIC | Age: 79
End: 2024-01-17
Payer: MEDICARE

## 2024-01-17 DIAGNOSIS — R39.9 UTI SYMPTOMS: Primary | ICD-10-CM

## 2024-01-17 DIAGNOSIS — R39.9 UTI SYMPTOMS: ICD-10-CM

## 2024-01-17 PROCEDURE — 87086 URINE CULTURE/COLONY COUNT: CPT

## 2024-01-18 DIAGNOSIS — E03.9 HYPOTHYROIDISM, UNSPECIFIED: ICD-10-CM

## 2024-01-18 LAB — BACTERIA UR CULT: NORMAL

## 2024-01-22 RX ORDER — LEVOTHYROXINE SODIUM 50 UG/1
50 TABLET ORAL DAILY
Qty: 90 TABLET | Refills: 1 | Status: SHIPPED | OUTPATIENT
Start: 2024-01-22

## 2024-02-03 DIAGNOSIS — E78.5 HYPERLIPIDEMIA, UNSPECIFIED: ICD-10-CM

## 2024-02-06 RX ORDER — ATORVASTATIN CALCIUM 20 MG/1
TABLET, FILM COATED ORAL
Qty: 90 TABLET | Refills: 0 | Status: SHIPPED | OUTPATIENT
Start: 2024-02-06 | End: 2024-06-07

## 2024-02-22 DIAGNOSIS — F32.9 MAJOR DEPRESSIVE DISORDER, SINGLE EPISODE, UNSPECIFIED: ICD-10-CM

## 2024-02-22 RX ORDER — FLUOXETINE HYDROCHLORIDE 20 MG/1
40 CAPSULE ORAL DAILY
Qty: 180 CAPSULE | Refills: 0 | Status: SHIPPED | OUTPATIENT
Start: 2024-02-22 | End: 2024-03-21 | Stop reason: SDUPTHER

## 2024-03-21 ENCOUNTER — OFFICE VISIT (OUTPATIENT)
Dept: PRIMARY CARE | Facility: CLINIC | Age: 79
End: 2024-03-21
Payer: MEDICARE

## 2024-03-21 VITALS
SYSTOLIC BLOOD PRESSURE: 120 MMHG | HEIGHT: 64 IN | TEMPERATURE: 97.3 F | BODY MASS INDEX: 28.79 KG/M2 | WEIGHT: 168.6 LBS | OXYGEN SATURATION: 99 % | DIASTOLIC BLOOD PRESSURE: 70 MMHG | HEART RATE: 71 BPM

## 2024-03-21 DIAGNOSIS — M85.80 OSTEOPENIA, UNSPECIFIED LOCATION: ICD-10-CM

## 2024-03-21 DIAGNOSIS — F32.9 MAJOR DEPRESSIVE DISORDER, SINGLE EPISODE, UNSPECIFIED: ICD-10-CM

## 2024-03-21 DIAGNOSIS — E03.9 HYPOTHYROIDISM (ACQUIRED): ICD-10-CM

## 2024-03-21 DIAGNOSIS — E78.2 MIXED HYPERLIPIDEMIA: ICD-10-CM

## 2024-03-21 DIAGNOSIS — R41.3 MEMORY LOSS: ICD-10-CM

## 2024-03-21 DIAGNOSIS — F32.A ANXIETY AND DEPRESSION: Primary | ICD-10-CM

## 2024-03-21 DIAGNOSIS — F41.9 ANXIETY AND DEPRESSION: Primary | ICD-10-CM

## 2024-03-21 PROCEDURE — 1036F TOBACCO NON-USER: CPT | Performed by: STUDENT IN AN ORGANIZED HEALTH CARE EDUCATION/TRAINING PROGRAM

## 2024-03-21 PROCEDURE — 99214 OFFICE O/P EST MOD 30 MIN: CPT | Performed by: STUDENT IN AN ORGANIZED HEALTH CARE EDUCATION/TRAINING PROGRAM

## 2024-03-21 PROCEDURE — 1159F MED LIST DOCD IN RCRD: CPT | Performed by: STUDENT IN AN ORGANIZED HEALTH CARE EDUCATION/TRAINING PROGRAM

## 2024-03-21 RX ORDER — FLUOXETINE HYDROCHLORIDE 20 MG/1
40 CAPSULE ORAL DAILY
Qty: 180 CAPSULE | Refills: 1 | Status: SHIPPED | OUTPATIENT
Start: 2024-03-21

## 2024-03-21 RX ORDER — BUSPIRONE HYDROCHLORIDE 5 MG/1
5 TABLET ORAL 2 TIMES DAILY
Qty: 180 TABLET | Refills: 1 | Status: SHIPPED | OUTPATIENT
Start: 2024-03-21

## 2024-03-21 ASSESSMENT — ENCOUNTER SYMPTOMS
DIARRHEA: 0
FATIGUE: 0
COUGH: 0
DYSURIA: 0
WHEEZING: 0
CONSTIPATION: 0
NERVOUS/ANXIOUS: 0
FREQUENCY: 0
DYSPHORIC MOOD: 0
PALPITATIONS: 0
HEADACHES: 0
NAUSEA: 0
DIZZINESS: 0
CHILLS: 0
NUMBNESS: 0
SHORTNESS OF BREATH: 0
ABDOMINAL PAIN: 0
HEMATURIA: 0
FEVER: 0

## 2024-03-21 ASSESSMENT — PATIENT HEALTH QUESTIONNAIRE - PHQ9
SUM OF ALL RESPONSES TO PHQ9 QUESTIONS 1 AND 2: 0
1. LITTLE INTEREST OR PLEASURE IN DOING THINGS: NOT AT ALL
2. FEELING DOWN, DEPRESSED OR HOPELESS: NOT AT ALL

## 2024-03-21 NOTE — PROGRESS NOTES
"Subjective   Patient ID: Valery Kiser is a 78 y.o. female who presents for Med Refill and Dementia (Talk about testing).    HPI   Anxiety and depression F/up  Current medication: Prozac 40 mg, buspirone 5 mg twice daily.  Buspirone added at last visit  How they feel: She thinks the added buspirone is helping a little bit, she notices that she feels better. Feels it has made the transition to Alvan easier.   Side effects: none  Self care: She is reading, watching television shows, walking the dog, spending time with , exercising three times per week.     She decided to stop playing bridge. Her memory isn't as good. She cannot remember what people bid.        Review of Systems   Constitutional:  Negative for chills, fatigue and fever.   Respiratory:  Negative for cough, shortness of breath and wheezing.    Cardiovascular:  Negative for chest pain, palpitations and leg swelling.   Gastrointestinal:  Negative for abdominal pain, constipation, diarrhea and nausea.   Genitourinary:  Negative for dysuria, frequency, hematuria and urgency.   Neurological:  Negative for dizziness, numbness and headaches.   Psychiatric/Behavioral:  Negative for dysphoric mood. The patient is not nervous/anxious.        Objective   /70 (BP Location: Left arm, Patient Position: Sitting, BP Cuff Size: Adult)   Pulse 71   Temp 36.3 °C (97.3 °F) (Temporal)   Ht 1.635 m (5' 4.37\")   Wt 76.5 kg (168 lb 9.6 oz)   SpO2 99%   BMI 28.61 kg/m²     Physical Exam  Constitutional:       Appearance: Normal appearance.   HENT:      Head: Normocephalic and atraumatic.   Eyes:      Extraocular Movements: Extraocular movements intact.      Pupils: Pupils are equal, round, and reactive to light.   Cardiovascular:      Rate and Rhythm: Normal rate and regular rhythm.      Heart sounds: Normal heart sounds. No murmur heard.  Pulmonary:      Effort: Pulmonary effort is normal.      Breath sounds: Normal breath sounds. No wheezing. "   Abdominal:      General: Bowel sounds are normal.      Palpations: Abdomen is soft.      Tenderness: There is no abdominal tenderness. There is no guarding.   Musculoskeletal:         General: Normal range of motion.   Skin:     General: Skin is warm and dry.   Neurological:      General: No focal deficit present.      Mental Status: She is alert and oriented to person, place, and time.   Psychiatric:         Mood and Affect: Mood normal.         Behavior: Behavior normal.       Assessment/Plan   Problem List Items Addressed This Visit       Hyperlipidemia    Relevant Orders    Lipid Panel    Comprehensive Metabolic Panel    CBC    Osteopenia    Relevant Orders    Vitamin D 25 hydroxy    Hypothyroidism (acquired)    Relevant Orders    TSH with reflex to Free T4 if abnormal    Anxiety and depression - Primary     Other Visit Diagnoses       Memory loss        Relevant Orders    Referral to Adult Neuropsychology          Will continue current medications and I have refilled buspirone and Prozac.  She has noticed changes in her memory as well as memory loss so I have placed referral to neuropsych for testing.  We will plan on following up in 6 months or sooner if needed.       Patient understands and agrees with treatment plan    Carmela Elise, DO   03/21/24

## 2024-03-25 DIAGNOSIS — K21.00 GASTROESOPHAGEAL REFLUX DISEASE WITH ESOPHAGITIS WITHOUT HEMORRHAGE: ICD-10-CM

## 2024-03-25 DIAGNOSIS — N39.0 URINARY TRACT INFECTION, SITE NOT SPECIFIED: ICD-10-CM

## 2024-03-25 RX ORDER — DEXLANSOPRAZOLE 60 MG/1
60 CAPSULE, DELAYED RELEASE ORAL DAILY
Qty: 30 CAPSULE | Refills: 0 | Status: SHIPPED | OUTPATIENT
Start: 2024-03-25 | End: 2024-04-18

## 2024-03-28 ENCOUNTER — TELEPHONE (OUTPATIENT)
Dept: OBSTETRICS AND GYNECOLOGY | Facility: CLINIC | Age: 79
End: 2024-03-28
Payer: MEDICARE

## 2024-03-28 RX ORDER — SULFAMETHOXAZOLE AND TRIMETHOPRIM 400; 80 MG/1; MG/1
1 TABLET ORAL DAILY
Qty: 90 TABLET | Refills: 1 | Status: SHIPPED | OUTPATIENT
Start: 2024-03-28

## 2024-04-18 DIAGNOSIS — K21.00 GASTROESOPHAGEAL REFLUX DISEASE WITH ESOPHAGITIS WITHOUT HEMORRHAGE: ICD-10-CM

## 2024-04-18 RX ORDER — DEXLANSOPRAZOLE 60 MG/1
1 CAPSULE, DELAYED RELEASE ORAL DAILY
Qty: 90 CAPSULE | Refills: 2 | Status: SHIPPED | OUTPATIENT
Start: 2024-04-18

## 2024-04-29 ENCOUNTER — TELEPHONE (OUTPATIENT)
Dept: PRIMARY CARE | Facility: CLINIC | Age: 79
End: 2024-04-29
Payer: MEDICARE

## 2024-04-29 NOTE — TELEPHONE ENCOUNTER
Patient  (on HIPAA) called and left message. Requsting appointment to discuss significant back issues. Can hardly walk far without having to stop and rest. Also would like to discuss some memory issues.

## 2024-04-29 NOTE — TELEPHONE ENCOUNTER
Called and spoke to patient.   There was a slot on thurs 5/2 at 8 am.   Scheduled for thurs 5/2 at 8 am.   She added that she has been doing PT, it helps for 1 month or 2 at most and the pain comes back.

## 2024-05-02 ENCOUNTER — TELEPHONE (OUTPATIENT)
Dept: PRIMARY CARE | Facility: CLINIC | Age: 79
End: 2024-05-02

## 2024-05-02 ENCOUNTER — OFFICE VISIT (OUTPATIENT)
Dept: PRIMARY CARE | Facility: CLINIC | Age: 79
End: 2024-05-02
Payer: MEDICARE

## 2024-05-02 VITALS
HEART RATE: 75 BPM | TEMPERATURE: 98.4 F | OXYGEN SATURATION: 96 % | SYSTOLIC BLOOD PRESSURE: 120 MMHG | DIASTOLIC BLOOD PRESSURE: 70 MMHG | WEIGHT: 170 LBS | BODY MASS INDEX: 28.85 KG/M2

## 2024-05-02 DIAGNOSIS — R41.3 MEMORY LOSS: ICD-10-CM

## 2024-05-02 DIAGNOSIS — M47.816 SPONDYLOSIS OF LUMBAR REGION WITHOUT MYELOPATHY OR RADICULOPATHY: ICD-10-CM

## 2024-05-02 DIAGNOSIS — M54.50 CHRONIC MIDLINE LOW BACK PAIN WITHOUT SCIATICA: Primary | ICD-10-CM

## 2024-05-02 DIAGNOSIS — H90.3 ASYMMETRICAL SENSORINEURAL HEARING LOSS: ICD-10-CM

## 2024-05-02 DIAGNOSIS — G89.29 CHRONIC MIDLINE LOW BACK PAIN WITHOUT SCIATICA: Primary | ICD-10-CM

## 2024-05-02 PROCEDURE — 1036F TOBACCO NON-USER: CPT | Performed by: STUDENT IN AN ORGANIZED HEALTH CARE EDUCATION/TRAINING PROGRAM

## 2024-05-02 PROCEDURE — 1159F MED LIST DOCD IN RCRD: CPT | Performed by: STUDENT IN AN ORGANIZED HEALTH CARE EDUCATION/TRAINING PROGRAM

## 2024-05-02 PROCEDURE — 99214 OFFICE O/P EST MOD 30 MIN: CPT | Performed by: STUDENT IN AN ORGANIZED HEALTH CARE EDUCATION/TRAINING PROGRAM

## 2024-05-02 ASSESSMENT — ENCOUNTER SYMPTOMS
COUGH: 0
DYSPHORIC MOOD: 0
HEADACHES: 0
DEPRESSION: 1
ABDOMINAL PAIN: 0
SHORTNESS OF BREATH: 0
DIARRHEA: 0
CHILLS: 0
FREQUENCY: 0
CONSTIPATION: 0
LOSS OF SENSATION IN FEET: 0
OCCASIONAL FEELINGS OF UNSTEADINESS: 1
WHEEZING: 0
DIZZINESS: 0
NERVOUS/ANXIOUS: 0
HEMATURIA: 0
DYSURIA: 0
FEVER: 0
FATIGUE: 0
NAUSEA: 0
NUMBNESS: 0
PALPITATIONS: 0

## 2024-05-02 NOTE — TELEPHONE ENCOUNTER
Patients  called and was asking for the number and name of the doctor for [facility] pain management.   Called and spoke to Talib and gave him [facility] pain Management's number  # [phone], there was no specific doctor.   Asked about knowing if it is covered, advised to call the insurance and find out if they can tell him and let him know what they need to get it covered if not.

## 2024-05-02 NOTE — PROGRESS NOTES
Subjective   Patient ID: Valery Kiser is a 78 y.o. female who presents for Back Pain (Follow up) and Memory Loss.    HPI   Has low back x several years. Xrays from 2 years ago showed advanced DDD. Pain is worse when walking. Standing for a long time makes it worse. Sitting makes it better. Takes tylenol and it helps. No n/t. No pain radiating down legs. No weakness. Balance is better after exercises. She is doing a lot of core work with exercises.   No bowel or bladder incontinence.   Has done PT.     She has noticed her memory getting worse. Has problems remembering people's names. Depends on her  to remind her. Couldn't remember what other people were bidding in bridge. Has always had issues with knowing where she is going when going to a new place. Remembering where she parked the car. Not confused. No forgetting to turn off stove.     She does have issues with hearing as well.    MMSE 27    Review of Systems   Constitutional:  Negative for chills, fatigue and fever.   Respiratory:  Negative for cough, shortness of breath and wheezing.    Cardiovascular:  Negative for chest pain, palpitations and leg swelling.   Gastrointestinal:  Negative for abdominal pain, constipation, diarrhea and nausea.   Genitourinary:  Negative for dysuria, frequency, hematuria and urgency.   Neurological:  Negative for dizziness, numbness and headaches.   Psychiatric/Behavioral:  Negative for dysphoric mood. The patient is not nervous/anxious.        Objective   /70 (BP Location: Left arm, Patient Position: Sitting, BP Cuff Size: Adult)   Pulse 75   Temp 36.9 °C (98.4 °F) (Temporal)   Wt 77.1 kg (170 lb)   SpO2 96%   BMI 28.85 kg/m²     Physical Exam  Constitutional:       Appearance: Normal appearance.   HENT:      Head: Normocephalic and atraumatic.   Eyes:      Extraocular Movements: Extraocular movements intact.      Pupils: Pupils are equal, round, and reactive to light.   Cardiovascular:      Rate and Rhythm:  Normal rate and regular rhythm.      Heart sounds: Normal heart sounds. No murmur heard.  Pulmonary:      Effort: Pulmonary effort is normal.      Breath sounds: Normal breath sounds. No wheezing.   Abdominal:      General: Bowel sounds are normal.      Palpations: Abdomen is soft.      Tenderness: There is no abdominal tenderness. There is no guarding.   Musculoskeletal:         General: Normal range of motion.   Skin:     General: Skin is warm and dry.   Neurological:      General: No focal deficit present.      Mental Status: She is alert and oriented to person, place, and time.   Psychiatric:         Mood and Affect: Mood normal.         Behavior: Behavior normal.         Assessment/Plan   Problem List Items Addressed This Visit       Asymmetrical sensorineural hearing loss    Relevant Orders    Referral to ENT    Chronic midline low back pain without sciatica - Primary    Relevant Orders    Referral to Pain Medicine     Other Visit Diagnoses       Spondylosis of lumbar region without myelopathy or radiculopathy        Relevant Orders    Referral to Pain Medicine    Memory loss              Referral to pain management for her low back for possible injections  Patient's MMSE was okay in office today.  She already has an appointment with neuropsych later this year.  I am wondering how much of her memory issues are related to her hearing loss, I am referring her to ENT for further evaluation       Patient understands and agrees with treatment plan    Carmela Elise, DO   05/02/24

## 2024-05-02 NOTE — PATIENT INSTRUCTIONS
Referral to ENT for further evaluation of your hearing which may be contributing to your memory issues  Referral to pain management for management of your lumbar spine osteoarthritis

## 2024-05-06 ENCOUNTER — TELEPHONE (OUTPATIENT)
Dept: PRIMARY CARE | Facility: CLINIC | Age: 79
End: 2024-05-06
Payer: MEDICARE

## 2024-05-06 NOTE — TELEPHONE ENCOUNTER
Pt's  called said that they got an appointment scheduled for 5/13/24 but the office needs us to fax the las 2 office notes and a copy of the last Xray report of her back.   Called him back told him that I have it ready to go but I have a different fax # then he gave. He is going to double check and get back to me.

## 2024-05-07 NOTE — TELEPHONE ENCOUNTER
Let me know that they received the records I faxed and that he would like the copy I printed sent to them for their records.   Copy's I printed out and faxed were put in the mail, per request.   Called and verified address to send it to and let Bill know that it was in the mail.

## 2024-05-22 DIAGNOSIS — N32.81 OAB (OVERACTIVE BLADDER): ICD-10-CM

## 2024-05-22 RX ORDER — SOLIFENACIN SUCCINATE 5 MG/1
5 TABLET, FILM COATED ORAL DAILY
Qty: 90 TABLET | Refills: 1 | Status: SHIPPED | OUTPATIENT
Start: 2024-05-22

## 2024-06-06 DIAGNOSIS — E78.5 HYPERLIPIDEMIA, UNSPECIFIED: ICD-10-CM

## 2024-06-07 RX ORDER — ATORVASTATIN CALCIUM 20 MG/1
TABLET, FILM COATED ORAL
Qty: 90 TABLET | Refills: 0 | Status: SHIPPED | OUTPATIENT
Start: 2024-06-07

## 2024-06-20 ENCOUNTER — APPOINTMENT (OUTPATIENT)
Dept: OTOLARYNGOLOGY | Facility: CLINIC | Age: 79
End: 2024-06-20
Payer: MEDICARE

## 2024-06-20 VITALS — BODY MASS INDEX: 27.15 KG/M2 | WEIGHT: 160 LBS

## 2024-06-20 DIAGNOSIS — H90.3 ASYMMETRICAL SENSORINEURAL HEARING LOSS: ICD-10-CM

## 2024-06-20 PROCEDURE — 99213 OFFICE O/P EST LOW 20 MIN: CPT | Performed by: GENERAL PRACTICE

## 2024-06-20 PROCEDURE — 1159F MED LIST DOCD IN RCRD: CPT | Performed by: GENERAL PRACTICE

## 2024-06-24 NOTE — PROGRESS NOTES
Otolaryngology - Head and Neck Surgery Outpatient New Patient Visit Note        Assessment/Plan   Problem List Items Addressed This Visit             ICD-10-CM       ENT    Asymmetrical sensorineural hearing loss H90.3       78yoF with L>R SNHL (small conductive component on L) essentially stable over years.  R>L subjective hearing loss after new hearing.  Given underlying hearing pattern, suspect hearing aid device is the issue.  Recommended follow up with audiology to investigate device and repeat audiogram PRN.    No otitis on exam.            Follow up:  -plan for follow up in clinic as needed    All of the above findings, impressions, treatment planning and follow up plans were discussed with the patient who indicated understanding.  the patient was instructed to contact or return to clinic sooner if symptoms/signs persist or worsen despite the above management.      Christopher Bauer MD  Otolaryngology - Head and Neck Surgery            History Of Present Illness  Valery Kiser is a 78 y.o. female presenting for R>L hearing loss with new hearing aids.     Reports history of L>R mixed hearing loss, with prior MRI IAC without retrocochlear pathology.    The patient reports slow progression of the hearing loss over time.  The paitent reports a history of intermittent, waxing/waning, nonpulsatile, tonal tinnitus which does not interfere with hearing.   The patient denies  a history of significant noise exposure due to occupational exposures, industrial noise, etc.     The patient denies sudden changes in hearing.  The patient denies otalgia, otorrhea, vertigo, or facial weakness.    The patient denies a history of otologic surgery or trauma.  The patient denies a history of blast injury, TBI or concussion associated with hearing loss.  The patient denies a family history of significant hearing loss.  The patient reports a history of AOM as a child, but no recent significant history of ear infection.  No reported  exposure to known ototoxins (chemotherapeutics, aminoglycosides, loop diurectics, high dose NSAIDs).   No reported history of radiation treatment to the head/neck.             Past Medical History  She has a past medical history of Bladder disorder, unspecified, Contusion of unspecified part of head, initial encounter (07/14/2016), Other abnormal findings in urine, Other fecal abnormalities, Other microscopic hematuria (07/07/2020), Personal history of other diseases of the digestive system (07/07/2020), Personal history of other diseases of the musculoskeletal system and connective tissue, Personal history of other drug therapy, Personal history of other medical treatment (07/07/2020), Personal history of other specified conditions (06/01/2016), Post-traumatic headache, unspecified, not intractable (07/14/2016), Unspecified macular degeneration, Urinary tract infection, site not specified (07/07/2020), and Urinary tract infection, site not specified (11/24/2015).    Surgical History  She has a past surgical history that includes Incisional breast biopsy (03/31/2014); Varicose vein surgery (03/31/2014); Belt abdominoplasty (03/31/2014); Other surgical history (03/31/2014); Other surgical history (03/31/2014); Dilation and curettage of uterus (03/31/2014); Other surgical history (11/11/2021); Appendectomy (02/21/2017); and Excision basal cell carcinoma (Right, 07/18/2023).     Social History  She reports that she quit smoking about 42 years ago. Her smoking use included cigarettes. She started smoking about 67 years ago. She has a 50 pack-year smoking history. She has been exposed to tobacco smoke. She has never used smokeless tobacco. She reports that she does not drink alcohol and does not use drugs.    Family History  Family History   Problem Relation Name Age of Onset    Other (hepatic cirrhosis) Mother      Hypertension Father      Other (CVA) Father      Multiple sclerosis Brother      Multiple sclerosis  Daughter          Allergies  Latex    Review of Systems  ROS: Pertinent positives as noted in HPI.    - CONSTITUTIONAL: Does not report weight loss, fever or chills.    - HEENT:   Ear: Does not report  , vertigo,    , otalgia, otorrhea  Nose: Does not report congestion, rhinorrhea, epistaxis, decreased smell  Throat: Does not report pain, dysphagia, odynophagia  Larynx: Does not report hoarseness,  difficulty breathing, pain with speaking (odynophonia)  Neck: Does not report new masses, pain, swelling  Face: Does not report sinus pain, pressure, swelling, numbness, weakness     - RESPIRATORY: Does not report SOB or cough.    - CV: Does not report palpitations or chest pain.     - GI: Does not report abdominal pain, nausea, vomiting or diarrhea.    - : Does not report dysuria or urinary frequency.    - MSK: Does not report myalgia or joint pain.    - SKIN: Does not report rash or pruritus.    - NEUROLOGICAL: Does not report headache or syncope.    - PSYCHIATRIC: Does not report recent changes in mood. Does not report anxiety or depression.         Physical Exam:     GENERAL:   Alert & Oriented to person, place and time; Normal affect and appearance. Well developed and well nourished. Conversant & cooperative with examination.     HEAD:   Normocephalic, atraumatic. No sinus tenderness to palpation. Normal parotid bilaterally. Normal facial strength.     NEUROLOGIC:   Cranial nerves II-XII grossly intact, gait WNL. Normal mood and affect.    EYES:   Extraocular movements intact. Pupils equal, round, reactive to light and accommodation. No nystagmus, no ptosis. no scleral injection.    EAR:   Normal auricle. No discomfort or TTP with manipulation.   Handheld otoscopic exam showed normal external auditory canals bilaterally. No purulence or EAC inflammation. Minimal cerumen.   Right tympanic membrane clear and mobile without evidence of perforation, retraction or middle ear effusion.   Left tympanic membrane clear and  mobile without evidence of perforation, retraction or middle ear effusion.     NOSE:   No external deformity. No external nasal lesions, lacerations, or scars. Nasal tip symmetrical with normal nasal valves.   Nasal cavity with essentially midline septum, normal mucosa and turbinates. No lesions, masses, purulence or polyps.     OC/OP:   Mucous membranes moist, no masses, lesions or exudates.   Normal tongue, floor of mouth, teeth, gums, lips. Normal posterior pharyngeal wall.    Normal tonsils without erythema, exudate or obvious calculi     NECK:   No neck masses or thyroid enlargement. Trachea midline. No tenderness to palpation    LYMPHATIC:   No cervical lymphadenopathy.     RESPIRATORY:   Symmetric chest elevation & no retractions. No significant hoarseness. No increased work of breathing.    CV:   No clubbing or cyanosis. No obvious edema    Skin:   No facial rashes, vesicles or lesions.     Extremities:   No gross abnormalities      Clinic Procedure        Information review:  External sources (notes, imaging, lab results) listed below personally reviewed to aid in medical decision making process.  -audio 5/23/23  -audio 11/11/21  -

## 2024-07-31 DIAGNOSIS — M85.80 OTHER SPECIFIED DISORDERS OF BONE DENSITY AND STRUCTURE, UNSPECIFIED SITE: ICD-10-CM

## 2024-08-05 ENCOUNTER — TELEPHONE (OUTPATIENT)
Dept: PRIMARY CARE | Facility: CLINIC | Age: 79
End: 2024-08-05
Payer: MEDICARE

## 2024-08-05 DIAGNOSIS — K21.00 GASTROESOPHAGEAL REFLUX DISEASE WITH ESOPHAGITIS WITHOUT HEMORRHAGE: ICD-10-CM

## 2024-08-05 RX ORDER — DEXLANSOPRAZOLE 60 MG/1
1 CAPSULE, DELAYED RELEASE ORAL DAILY
Qty: 90 CAPSULE | Refills: 1 | Status: SHIPPED | OUTPATIENT
Start: 2024-08-05

## 2024-08-05 NOTE — TELEPHONE ENCOUNTER
Per VM Spouse called asking rx of Alendondrate 70mg be sent to [facility].  She has an appointment scheduled 9/9/24

## 2024-08-06 ENCOUNTER — TELEPHONE (OUTPATIENT)
Dept: PRIMARY CARE | Facility: CLINIC | Age: 79
End: 2024-08-06
Payer: MEDICARE

## 2024-08-06 DIAGNOSIS — E03.9 HYPOTHYROIDISM, UNSPECIFIED: ICD-10-CM

## 2024-08-06 NOTE — TELEPHONE ENCOUNTER
Christen Kiser ARIADNE, requesting refill of Valery's Alendronate 70 mg, be sent to [facility] in [place].    LOV - 5/2/24 NOV - 9/9/24

## 2024-08-07 RX ORDER — LEVOTHYROXINE SODIUM 50 UG/1
50 TABLET ORAL DAILY
Qty: 30 TABLET | Refills: 1 | Status: SHIPPED | OUTPATIENT
Start: 2024-08-07

## 2024-08-08 RX ORDER — ALENDRONATE SODIUM 70 MG/1
70 TABLET ORAL
Qty: 12 TABLET | Refills: 3 | Status: SHIPPED | OUTPATIENT
Start: 2024-08-11

## 2024-08-19 DIAGNOSIS — F32.9 MAJOR DEPRESSIVE DISORDER, SINGLE EPISODE, UNSPECIFIED: ICD-10-CM

## 2024-08-22 RX ORDER — FLUOXETINE HYDROCHLORIDE 20 MG/1
40 CAPSULE ORAL DAILY
Qty: 180 CAPSULE | Refills: 1 | Status: SHIPPED | OUTPATIENT
Start: 2024-08-22

## 2024-08-30 DIAGNOSIS — E78.5 HYPERLIPIDEMIA, UNSPECIFIED: ICD-10-CM

## 2024-08-30 RX ORDER — ATORVASTATIN CALCIUM 20 MG/1
TABLET, FILM COATED ORAL
Qty: 90 TABLET | Refills: 0 | Status: SHIPPED | OUTPATIENT
Start: 2024-08-30

## 2024-09-09 ENCOUNTER — APPOINTMENT (OUTPATIENT)
Dept: PRIMARY CARE | Facility: CLINIC | Age: 79
End: 2024-09-09
Payer: MEDICARE

## 2024-09-09 VITALS
BODY MASS INDEX: 29.64 KG/M2 | TEMPERATURE: 97.1 F | HEIGHT: 64 IN | SYSTOLIC BLOOD PRESSURE: 124 MMHG | DIASTOLIC BLOOD PRESSURE: 86 MMHG | WEIGHT: 173.6 LBS

## 2024-09-09 DIAGNOSIS — Z13.31 DEPRESSION SCREENING: ICD-10-CM

## 2024-09-09 DIAGNOSIS — Z00.00 HEALTH MAINTENANCE EXAMINATION: ICD-10-CM

## 2024-09-09 DIAGNOSIS — F41.9 ANXIETY AND DEPRESSION: ICD-10-CM

## 2024-09-09 DIAGNOSIS — Z00.00 MEDICARE ANNUAL WELLNESS VISIT, SUBSEQUENT: Primary | ICD-10-CM

## 2024-09-09 DIAGNOSIS — F32.A ANXIETY AND DEPRESSION: ICD-10-CM

## 2024-09-09 DIAGNOSIS — Z23 ENCOUNTER FOR IMMUNIZATION: ICD-10-CM

## 2024-09-09 DIAGNOSIS — F32.4 MAJOR DEPRESSIVE DISORDER WITH SINGLE EPISODE, IN PARTIAL REMISSION (CMS-HCC): ICD-10-CM

## 2024-09-09 DIAGNOSIS — E78.2 MIXED HYPERLIPIDEMIA: ICD-10-CM

## 2024-09-09 PROCEDURE — 1036F TOBACCO NON-USER: CPT | Performed by: STUDENT IN AN ORGANIZED HEALTH CARE EDUCATION/TRAINING PROGRAM

## 2024-09-09 PROCEDURE — G0008 ADMIN INFLUENZA VIRUS VAC: HCPCS | Performed by: STUDENT IN AN ORGANIZED HEALTH CARE EDUCATION/TRAINING PROGRAM

## 2024-09-09 PROCEDURE — 1160F RVW MEDS BY RX/DR IN RCRD: CPT | Performed by: STUDENT IN AN ORGANIZED HEALTH CARE EDUCATION/TRAINING PROGRAM

## 2024-09-09 PROCEDURE — G0444 DEPRESSION SCREEN ANNUAL: HCPCS | Performed by: STUDENT IN AN ORGANIZED HEALTH CARE EDUCATION/TRAINING PROGRAM

## 2024-09-09 PROCEDURE — 99213 OFFICE O/P EST LOW 20 MIN: CPT | Performed by: STUDENT IN AN ORGANIZED HEALTH CARE EDUCATION/TRAINING PROGRAM

## 2024-09-09 PROCEDURE — 99397 PER PM REEVAL EST PAT 65+ YR: CPT | Performed by: STUDENT IN AN ORGANIZED HEALTH CARE EDUCATION/TRAINING PROGRAM

## 2024-09-09 PROCEDURE — 1159F MED LIST DOCD IN RCRD: CPT | Performed by: STUDENT IN AN ORGANIZED HEALTH CARE EDUCATION/TRAINING PROGRAM

## 2024-09-09 PROCEDURE — G0439 PPPS, SUBSEQ VISIT: HCPCS | Performed by: STUDENT IN AN ORGANIZED HEALTH CARE EDUCATION/TRAINING PROGRAM

## 2024-09-09 PROCEDURE — 90677 PCV20 VACCINE IM: CPT | Performed by: STUDENT IN AN ORGANIZED HEALTH CARE EDUCATION/TRAINING PROGRAM

## 2024-09-09 PROCEDURE — 90662 IIV NO PRSV INCREASED AG IM: CPT | Performed by: STUDENT IN AN ORGANIZED HEALTH CARE EDUCATION/TRAINING PROGRAM

## 2024-09-09 PROCEDURE — G0009 ADMIN PNEUMOCOCCAL VACCINE: HCPCS | Performed by: STUDENT IN AN ORGANIZED HEALTH CARE EDUCATION/TRAINING PROGRAM

## 2024-09-09 PROCEDURE — 1170F FXNL STATUS ASSESSED: CPT | Performed by: STUDENT IN AN ORGANIZED HEALTH CARE EDUCATION/TRAINING PROGRAM

## 2024-09-09 RX ORDER — BUSPIRONE HYDROCHLORIDE 5 MG/1
5 TABLET ORAL 2 TIMES DAILY
Qty: 180 TABLET | Refills: 3 | Status: SHIPPED | OUTPATIENT
Start: 2024-09-09

## 2024-09-09 RX ORDER — ATORVASTATIN CALCIUM 20 MG/1
20 TABLET, FILM COATED ORAL NIGHTLY
Qty: 90 TABLET | Refills: 3 | Status: SHIPPED | OUTPATIENT
Start: 2024-09-09

## 2024-09-09 RX ORDER — FLUOXETINE HYDROCHLORIDE 20 MG/1
40 CAPSULE ORAL DAILY
Qty: 180 CAPSULE | Refills: 3 | Status: SHIPPED | OUTPATIENT
Start: 2024-09-09

## 2024-09-09 ASSESSMENT — PATIENT HEALTH QUESTIONNAIRE - PHQ9
2. FEELING DOWN, DEPRESSED OR HOPELESS: SEVERAL DAYS
SUM OF ALL RESPONSES TO PHQ9 QUESTIONS 1 AND 2: 1
SUM OF ALL RESPONSES TO PHQ9 QUESTIONS 1 AND 2: 2
10. IF YOU CHECKED OFF ANY PROBLEMS, HOW DIFFICULT HAVE THESE PROBLEMS MADE IT FOR YOU TO DO YOUR WORK, TAKE CARE OF THINGS AT HOME, OR GET ALONG WITH OTHER PEOPLE: SOMEWHAT DIFFICULT
6. FEELING BAD ABOUT YOURSELF - OR THAT YOU ARE A FAILURE OR HAVE LET YOURSELF OR YOUR FAMILY DOWN: SEVERAL DAYS
2. FEELING DOWN, DEPRESSED OR HOPELESS: SEVERAL DAYS
1. LITTLE INTEREST OR PLEASURE IN DOING THINGS: SEVERAL DAYS
1. LITTLE INTEREST OR PLEASURE IN DOING THINGS: NOT AT ALL

## 2024-09-09 ASSESSMENT — ENCOUNTER SYMPTOMS
DYSURIA: 0
CONSTIPATION: 0
NAUSEA: 0
FEVER: 0
DIZZINESS: 0
SHORTNESS OF BREATH: 0
CHILLS: 0
HEMATURIA: 0
NUMBNESS: 0
FATIGUE: 0
HEADACHES: 0
FREQUENCY: 0
COUGH: 0
DIARRHEA: 0
PALPITATIONS: 0
DYSPHORIC MOOD: 0
WHEEZING: 0
NERVOUS/ANXIOUS: 0
ABDOMINAL PAIN: 0

## 2024-09-09 ASSESSMENT — ACTIVITIES OF DAILY LIVING (ADL)
GROCERY_SHOPPING: INDEPENDENT
TAKING_MEDICATION: INDEPENDENT
DRESSING: INDEPENDENT
BATHING: INDEPENDENT
MANAGING_FINANCES: INDEPENDENT
DOING_HOUSEWORK: INDEPENDENT

## 2024-09-09 NOTE — PROGRESS NOTES
Subjective   Reason for Visit: Valery Kiser is an 78 y.o. female here for a Medicare Wellness visit.   Past Medical, Surgical, and Family History reviewed and updated in chart.    Reviewed all medications by prescribing practitioner or clinical pharmacist (such as prescriptions, OTCs, herbal therapies and supplements) and documented in the medical record.    HPI  Specialists seen by patient: Urology: [facility], dr Mari  -Derm: [place]  -ENT: Dr Johns at [facility], hearing loss  -GI: Will be seeing possibly Dr Hylton  -OBGYN: Dr Salgado  -ophthalmo: Dr Mars for macular degeneration, injections  -dentist  -pain management: Dr Pham, injections and RFA    Last pap/cervical cancer screening: n/a  Last mammogram:  n/a  Hx of colon ca screening: n/a 2017  Hx of DXA: 2023, osteopenia total femur -1.9, femoral neck -1.7  History of depression or anxiety:yes, prozac not overly well controlled  Immunizations: not up to date - needs tdap, and will get flu today  Diet: Follows a healthy diet  Exercise: Gets regular exercise,   Alcohol abuse screen:   none   How many times in the past year 4 or more drinks in a day? 0  Lung cancer screening:   Smoking history: ex-smoker and quit 40 years ago  Drug use: No  HCPOA: Yes, reviewed  Patient Self Assessment of Health Status  Patient Self Assessment: Good  Functional Ability/Level of Safety  Cognitive Impairment Observed: No cognitive impairment observed  [place] Home Safety Risk Factors  Home Safety Risk Factors: None  Nutrition and Exercise  Current Diet: Heart Healthy Diet  Adequate Fluid Intake: Yes  Caffeine: Yes  Exercise Frequency: Regularly  ADL Screening  Hearing - Right Ear: Hearing aid  Hearing - Left Ear: Hearing aid  Bathing: Independent  Dressing: Independent  Walks in Home: Independent  IADL's  Managing Finances: Independent  Grocery Shopping: Independent  Taking Medication: Independent  Doing Housework: Independent    Feels anxiety and depression are better. She is  "on 40 mg of prozac and 5 mg bid of buspirone. Better than it was but still has some social anxiety.     Did an RFA in July. Has not done steroid injections to her knowledge. It hadn't worked but last night it got better. She has done PT. She was having trouble walking and she has to lean to the side.       Patient Care Team:  Carmela Elise DO as PCP - General  Eric Bragg MD as PCP - [employer] PCP     Review of Systems   Constitutional:  Negative for chills, fatigue and fever.   Respiratory:  Negative for cough, shortness of breath and wheezing.    Cardiovascular:  Negative for chest pain, palpitations and leg swelling.   Gastrointestinal:  Negative for abdominal pain, constipation, diarrhea and nausea.   Genitourinary:  Negative for dysuria, frequency, hematuria and urgency.   Neurological:  Negative for dizziness, numbness and headaches.   Psychiatric/Behavioral:  Negative for dysphoric mood. The patient is not nervous/anxious.        Objective   Vitals:  /86   Temp 36.2 °C (97.1 °F)   Ht 1.613 m (5' 3.5\")   Wt 78.7 kg (173 lb 9.6 oz)   BMI 30.27 kg/m²       Physical Exam  Constitutional:       General: She is not in acute distress.     Appearance: Normal appearance.   HENT:      Head: Normocephalic and atraumatic.      Right Ear: Tympanic membrane and ear canal normal.      Left Ear: Tympanic membrane and ear canal normal.      Mouth/Throat:      Mouth: Mucous membranes are moist.      Pharynx: No posterior oropharyngeal erythema.   Eyes:      Extraocular Movements: Extraocular movements intact.      Pupils: Pupils are equal, round, and reactive to light.   Cardiovascular:      Rate and Rhythm: Normal rate and regular rhythm.      Heart sounds: No murmur heard.  Pulmonary:      Effort: Pulmonary effort is normal. No respiratory distress.      Breath sounds: Normal breath sounds. No wheezing.   Abdominal:      General: Bowel sounds are normal.      Palpations: Abdomen is soft.    "   Tenderness: There is no abdominal tenderness. There is no guarding.   Musculoskeletal:         General: Normal range of motion.      Cervical back: Neck supple.   Skin:     General: Skin is warm and dry.          Neurological:      General: No focal deficit present.      Mental Status: She is alert and oriented to person, place, and time.   Psychiatric:         Mood and Affect: Mood normal.         Behavior: Behavior normal.         Assessment/Plan   Problem List Items Addressed This Visit       Anxiety and depression    Relevant Medications    busPIRone (Buspar) 5 mg tablet     Other Visit Diagnoses       Encounter for immunization    -  Primary    Relevant Orders    Pneumococcal conjugate vaccine, 20-valent (PREVNAR 20) (Completed)    Flu vaccine, trivalent, preservative free, HIGH-DOSE, age 65y+ (Fluzone) (Completed)    Hyperlipidemia, unspecified        Relevant Medications    atorvastatin (Lipitor) 20 mg tablet    Major depressive disorder, single episode, unspecified        Relevant Medications    FLUoxetine (PROzac) 20 mg capsule            We will obtain fasting blood work.  Results will be communicated to the patient via MyChart or a letter.   We reviewed appropriate preventative health screening guidelines. We discussed regular aerobic exercise. We discussed proper nutrition and weight control.    Patient doing well from an anxiety standpoint.  Will keep her on her current medications and follow-up in a year for her Medicare physical    She is going to let me know if she does want to do neuropsych/memory testing, but right now she is worried about her back

## 2024-09-10 ENCOUNTER — LAB (OUTPATIENT)
Dept: LAB | Facility: LAB | Age: 79
End: 2024-09-10
Payer: MEDICARE

## 2024-09-10 DIAGNOSIS — E03.9 HYPOTHYROIDISM (ACQUIRED): ICD-10-CM

## 2024-09-10 DIAGNOSIS — E78.2 MIXED HYPERLIPIDEMIA: ICD-10-CM

## 2024-09-10 DIAGNOSIS — M85.80 OSTEOPENIA, UNSPECIFIED LOCATION: ICD-10-CM

## 2024-09-10 LAB
25(OH)D3 SERPL-MCNC: 53 NG/ML (ref 30–100)
ALBUMIN SERPL BCP-MCNC: 3.9 G/DL (ref 3.4–5)
ALP SERPL-CCNC: 57 U/L (ref 33–136)
ALT SERPL W P-5'-P-CCNC: 10 U/L (ref 7–45)
ANION GAP SERPL CALC-SCNC: 11 MMOL/L (ref 10–20)
AST SERPL W P-5'-P-CCNC: 10 U/L (ref 9–39)
BILIRUB SERPL-MCNC: 0.7 MG/DL (ref 0–1.2)
BUN SERPL-MCNC: 17 MG/DL (ref 6–23)
CALCIUM SERPL-MCNC: 8.9 MG/DL (ref 8.6–10.3)
CHLORIDE SERPL-SCNC: 105 MMOL/L (ref 98–107)
CHOLEST SERPL-MCNC: 178 MG/DL (ref 0–199)
CHOLESTEROL/HDL RATIO: 3.2
CO2 SERPL-SCNC: 28 MMOL/L (ref 21–32)
CREAT SERPL-MCNC: 0.95 MG/DL (ref 0.5–1.05)
EGFRCR SERPLBLD CKD-EPI 2021: 61 ML/MIN/1.73M*2
ERYTHROCYTE [DISTWIDTH] IN BLOOD BY AUTOMATED COUNT: 13 % (ref 11.5–14.5)
GLUCOSE SERPL-MCNC: 99 MG/DL (ref 74–99)
HCT VFR BLD AUTO: 40.2 % (ref 36–46)
HDLC SERPL-MCNC: 55.8 MG/DL
HGB BLD-MCNC: 13.4 G/DL (ref 12–16)
LDLC SERPL CALC-MCNC: 105 MG/DL
MCH RBC QN AUTO: 31.3 PG (ref 26–34)
MCHC RBC AUTO-ENTMCNC: 33.3 G/DL (ref 32–36)
MCV RBC AUTO: 94 FL (ref 80–100)
NON HDL CHOLESTEROL: 122 MG/DL (ref 0–149)
NRBC BLD-RTO: 0 /100 WBCS (ref 0–0)
PLATELET # BLD AUTO: 290 X10*3/UL (ref 150–450)
POTASSIUM SERPL-SCNC: 4.2 MMOL/L (ref 3.5–5.3)
PROT SERPL-MCNC: 6.4 G/DL (ref 6.4–8.2)
RBC # BLD AUTO: 4.28 X10*6/UL (ref 4–5.2)
SODIUM SERPL-SCNC: 140 MMOL/L (ref 136–145)
TRIGL SERPL-MCNC: 87 MG/DL (ref 0–149)
TSH SERPL-ACNC: 2.09 MIU/L (ref 0.44–3.98)
VLDL: 17 MG/DL (ref 0–40)
WBC # BLD AUTO: 7.8 X10*3/UL (ref 4.4–11.3)

## 2024-09-10 PROCEDURE — 80061 LIPID PANEL: CPT

## 2024-09-10 PROCEDURE — 84443 ASSAY THYROID STIM HORMONE: CPT

## 2024-09-10 PROCEDURE — 85027 COMPLETE CBC AUTOMATED: CPT

## 2024-09-10 PROCEDURE — 80053 COMPREHEN METABOLIC PANEL: CPT

## 2024-09-10 PROCEDURE — 36415 COLL VENOUS BLD VENIPUNCTURE: CPT

## 2024-09-10 PROCEDURE — 82306 VITAMIN D 25 HYDROXY: CPT

## 2024-09-16 ENCOUNTER — APPOINTMENT (OUTPATIENT)
Dept: PSYCHOLOGY | Facility: HOSPITAL | Age: 79
End: 2024-09-16
Payer: MEDICARE

## 2024-09-30 ENCOUNTER — TELEPHONE (OUTPATIENT)
Dept: OBSTETRICS AND GYNECOLOGY | Facility: CLINIC | Age: 79
End: 2024-09-30
Payer: MEDICARE

## 2024-09-30 DIAGNOSIS — N32.81 OAB (OVERACTIVE BLADDER): ICD-10-CM

## 2024-10-01 ENCOUNTER — TELEPHONE (OUTPATIENT)
Dept: OBSTETRICS AND GYNECOLOGY | Facility: CLINIC | Age: 79
End: 2024-10-01
Payer: MEDICARE

## 2024-10-01 DIAGNOSIS — N32.81 OAB (OVERACTIVE BLADDER): ICD-10-CM

## 2024-10-01 RX ORDER — SOLIFENACIN SUCCINATE 5 MG/1
5 TABLET, FILM COATED ORAL DAILY
Qty: 90 TABLET | Refills: 1 | Status: SHIPPED | OUTPATIENT
Start: 2024-10-01

## 2024-10-01 RX ORDER — SOLIFENACIN SUCCINATE 5 MG/1
5 TABLET, FILM COATED ORAL DAILY
Qty: 90 TABLET | Refills: 1 | Status: CANCELLED | OUTPATIENT
Start: 2024-10-01

## 2024-10-01 NOTE — TELEPHONE ENCOUNTER
Left phone message for Valery Kiser asking for name of med to be refilled.  Informed that since its been over a year, she will need to make an appointment.

## 2024-10-01 NOTE — TELEPHONE ENCOUNTER
Request received for   Requested Prescriptions      No prescriptions requested or ordered in this encounter       Valery Kiser was last seen 7/6/2023 and has an appt for 12/5/2024.

## 2024-10-01 NOTE — TELEPHONE ENCOUNTER
Copied from CRM #[id number]. Topic: Information Request - Prescription Refill FAQ  >> Oct 1, 2024  2:11 PM Nancy HARRIS wrote:   Above patient would like to have medication refill - ICSULFANETHOXAZOLE  ran out her next appointment in December please give patient a call back

## 2024-10-01 NOTE — TELEPHONE ENCOUNTER
Request received for   Requested Prescriptions     Pending Prescriptions Disp Refills    solifenacin (VESIcare) 5 mg tablet 90 tablet 1     Sig: Take 1 tablet (5 mg) by mouth once daily.       Valery Kiser was last seen 7/26/2023 and has an appt for 12/5/2024.

## 2024-10-03 ENCOUNTER — TELEPHONE (OUTPATIENT)
Dept: OBSTETRICS AND GYNECOLOGY | Facility: CLINIC | Age: 79
End: 2024-10-03
Payer: MEDICARE

## 2024-10-08 ENCOUNTER — TELEMEDICINE (OUTPATIENT)
Dept: OBSTETRICS AND GYNECOLOGY | Facility: CLINIC | Age: 79
End: 2024-10-08
Payer: MEDICARE

## 2024-10-08 DIAGNOSIS — N39.0 RECURRENT UTI: Primary | ICD-10-CM

## 2024-10-08 PROCEDURE — 99441 PR PHYS/QHP TELEPHONE EVALUATION 5-10 MIN: CPT | Performed by: NURSE PRACTITIONER

## 2024-10-08 RX ORDER — SULFAMETHOXAZOLE AND TRIMETHOPRIM 400; 80 MG/1; MG/1
1 TABLET ORAL DAILY
Qty: 90 TABLET | Refills: 1 | Status: SHIPPED | OUTPATIENT
Start: 2024-10-08

## 2024-10-09 DIAGNOSIS — E03.9 HYPOTHYROIDISM, UNSPECIFIED: ICD-10-CM

## 2024-10-09 RX ORDER — LEVOTHYROXINE SODIUM 50 UG/1
50 TABLET ORAL DAILY
Qty: 90 TABLET | Refills: 3 | Status: SHIPPED | OUTPATIENT
Start: 2024-10-09

## 2024-11-09 DIAGNOSIS — N32.81 OAB (OVERACTIVE BLADDER): ICD-10-CM

## 2024-11-11 RX ORDER — SOLIFENACIN SUCCINATE 5 MG/1
5 TABLET, FILM COATED ORAL DAILY
Qty: 90 TABLET | Refills: 1 | Status: SHIPPED | OUTPATIENT
Start: 2024-11-11

## 2024-11-11 NOTE — TELEPHONE ENCOUNTER
Request received for   Requested Prescriptions     Pending Prescriptions Disp Refills    solifenacin (VESIcare) 5 mg tablet [Pharmacy Med Name: SOLIFENACIN 5 MG TABLET] 90 tablet 1     Sig: TAKE 1 TABLET BY MOUTH EVERY DAY       Valery Kiser was last seen 10/3/2024 and has an appt for 12/5/2024.

## 2024-12-04 NOTE — PROGRESS NOTES
Urogynecology  Provider:  Kim Salgado MD  [phone]        ASSESSMENT AND PLAN:   79 y.o. female with hx of OAB and Bree. Co morbidities: HLD, Hypothyroidism, GERD.     Diagnoses:   #1 OAB       Plan:   1. OAB, urinary retention   - PVR = 345 ml's by U/S.   - PVR = 450 ml's by s/c.   - Urine dip is negative.   - She will stop the Solifenacin due to urinary retention.   - Patient will RTC in 2-3 weeks for a PVR check. At that time, we can consider starting Trospium 20 mg 1/2 tablet BID or Myrbetriq.   - She is amenable to this. All questions answered. Discussed with the patient the importance of managing urinary retention aggressively and possible associated complications of not emptying bladder.     Follow-up in 2-3 weeks with Vanesa STERLING for a PVR.     Scribe Attestation:   I, Emilia Woods, am scribing for virtually, and in the presence of Kim Salgado MD on 12/05/2024 at 4:48 PM.     Agree with above. I Dr. Salgado, personally performed the services described in the documentation which was scribed virtually and confirm it is both complete and accurate.  Kim Salgado MD      Problem List Items Addressed This Visit    None          I spent a total of eConsult Time: 30 minutes in face to face and non face to face time.        Kim Salgado MD        HISTORY OF PRESENT ILLNESS:   Valery Kiser is a 79 y.o. female who presents for follow up on OAB.     Record Review:   Saw  7/2023  Plan:  - She wishes to continue her regimen of solifenacin 5mg daily, estrogen cream 0.1mg bi-weekly, and Bactrim 400/80 daily to prevent UTI and manage OAB/vaginal atrophy symptoms.   - Fu in 1 year or prn.      Urinary Symptoms:   - She is on Solifenacin 5 mg daily.   - Pt felt like she was able to empty her bladder only a small amount today. She hasn't drank much fluid today.   - She reports the Solifenacin is working well.   - Denies having urine leakage recently.        Past Medical  History:      Past Medical History:   Diagnosis Date    Bladder disorder, unspecified     Bladder disorder    Contusion of unspecified part of head, initial encounter 07/14/2016    Contusion of head, unspecified part of head, initial encounter    Other abnormal findings in urine     High urine leukocyte count    Other fecal abnormalities     Occult blood positive stool    Other microscopic hematuria 07/07/2020    Other microscopic hematuria    Personal history of other diseases of the digestive system 07/07/2020    History of gastritis    Personal history of other diseases of the musculoskeletal system and connective tissue     History of arthritis    Personal history of other drug therapy     COVID-19 vaccine series completed    Personal history of other medical treatment 07/07/2020    History of screening mammography    Personal history of other specified conditions 06/01/2016    History of nausea and vomiting    Post-traumatic headache, unspecified, not intractable 07/14/2016    Post-concussion headache    Unspecified macular degeneration     Macular degeneration of right eye, unspecified type    Urinary tract infection, site not specified 07/07/2020    Acute lower UTI    Urinary tract infection, site not specified 11/24/2015    Acute UTI          Past Surgical History:       Past Surgical History:   Procedure Laterality Date    APPENDECTOMY  02/21/2017    Appendectomy    BASAL CELL CARCINOMA EXCISION Right 07/18/2023    right shoulder    BELT ABDOMINOPLASTY  03/31/2014    Abdominoplasty    DILATION AND CURETTAGE OF UTERUS  03/31/2014    Dilation And Curettage    INCISIONAL BREAST BIOPSY  03/31/2014    Incisional Breast Biopsy    OTHER SURGICAL HISTORY  03/31/2014    Laparoscopy (Diagnostic) Appendix    OTHER SURGICAL HISTORY  03/31/2014    Cholecystotomy    OTHER SURGICAL HISTORY  11/11/2021    Tonsillectomy with adenoidectomy    VARICOSE VEIN SURGERY  03/31/2014    Varicose Vein Ligation         Medications:        Prior to Admission medications    Medication Sig Start Date End Date Taking? Authorizing Provider   aflibercept (Eylea) 2 mg/0.05 mL intra-ocular injection Administer 1 mg into the right eye every 30 (thirty) days.    Historical Provider, MD   alendronate (Fosamax) 70 mg tablet TAKE 1 TABLET BY MOUTH ONE TIME PER WEEK 8/11/24   Carmela Elise DO   atorvastatin (Lipitor) 20 mg tablet Take 1 tablet (20 mg) by mouth once daily at bedtime. 9/9/24   Carmela Elise DO   busPIRone (Buspar) 5 mg tablet Take 1 tablet (5 mg) by mouth 2 times a day. 9/9/24   Carmela Elise DO   cholecalciferol (Vitamin D-3) 5,000 Units tablet Take 1 tablet (5,000 Units) by mouth once daily. 3/31/14   Historical Provider, MD   cyanocobalamin (Vitamin B-12) 1,000 mcg tablet Take by mouth. 9/6/19   Historical Provider, MD   dexlansoprazole (Dexilant) 60 mg DR capsule Take 1 capsule (60 mg) by mouth once daily. 8/5/24   Frederick Hylton MD   erythromycin (Romycin) 5 mg/gram (0.5 %) ophthalmic ointment Apply to affected eye(s). 12/3/21   Historical Provider, MD   estradiol (Estrace) 0.1 MG/GM vaginal cream APPLY THE AMOUNT OF CREAM DIRECTED BY DOCTOR TO THE URETHRAL ORIFICE TWICE A WEEK    Historical Provider, MD   fexofenadine (Allegra Allergy) 180 mg tablet Take by mouth. 8/17/15   Historical Provider, MD   FLUoxetine (PROzac) 20 mg capsule Take 2 capsules (40 mg) by mouth once daily. 9/9/24   Carmela Elise DO   fluticasone (Flonase) 50 mcg/actuation nasal spray Administer into affected nostril(s).    Historical Provider, MD   tseluupj-hzrf-vzd4-C-parth-bosw (Osteo Bi-Flex Triple Strength) 750 mg-644 mg- 30 mg-1 mg tablet Take 1 tablet by mouth once daily. 11/18/15   Historical Provider, MD   levothyroxine (Synthroid, Levoxyl) 50 mcg tablet TAKE 1 TABLET BY MOUTH EVERY DAY 10/9/24   Carmela H Arik, DO   peg 400-propylene glycol 0.4-0.3 % drops Administer into affected eye(s). 12/3/21   Historical Provider, MD   solifenacin  "(VESIcare) 5 mg tablet TAKE 1 TABLET BY MOUTH EVERY DAY 11/11/24   Vaensa Starkiggi, APRN-CNP   sulfamethoxazole-trimethoprim (Bactrim) 400-80 mg tablet Take 1 tablet by mouth once daily. 10/8/24   Vanesa MariHALLIE   vit C/E/zinc ox/edilia/lut/zeax (ICAPS AREDS2 ORAL) Take 2 tablets by mouth once daily.    Historical Provider, MD ZEPEDA  Review of Systems   All other systems reviewed and are negative.       No results found for: \"BLOODU\", \"NITRITEU\", \"UROBILINOGEN\"      PHYSICAL EXAM:      There were no vitals taken for this visit.     No LMP recorded.      Declines chaperone for physical exam.      Well developed, well nourished, in no apparent distress.   Neurologic/Psychiatric:  Awake, Alert and Oriented times 3.  Affect normal.     GENITAL/URINARY:       External Genitalia:  The patient has normal appearing external genitalia, normal skenes and bartholins glands, and a normal hair distribution.  Her vulva is without lesions, erythema or discharge.  It is non-tender with appropriate sensation.     Urethral Meatus:  Size normal, Location normal, Lesions absent, Prolapse absent,      Urethra:  Fullness absent, Masses absent,      Bladder:  Fullness absent, Masses absent, Tenderness absent,  No prolapse    Vagina:  General appearance normal, Discharge absent, Lesions absent,          POP-Q:  Position: sitting  No significant POP.     Aa: -1       Ba:  C:    Gh:  Pb:  TVL:          Ap:  Bp:  D:                Data and DIAGNOSTIC STUDIES REVIEWED   No results found.   Lab Results   Component Value Date    URINECULTURE No significant growth 01/17/2024      Lab Results   Component Value Date    GLUCOSE 99 09/10/2024    CALCIUM 8.9 09/10/2024     09/10/2024    K 4.2 09/10/2024    CO2 28 09/10/2024     09/10/2024    BUN 17 09/10/2024    CREATININE 0.95 09/10/2024     Lab Results   Component Value Date    WBC 7.8 09/10/2024    HGB 13.4 09/10/2024    HCT 40.2 09/10/2024    MCV 94 09/10/2024    PLT " 290 09/10/2024          Kim Salgado MD

## 2024-12-05 ENCOUNTER — OFFICE VISIT (OUTPATIENT)
Dept: OBSTETRICS AND GYNECOLOGY | Facility: CLINIC | Age: 79
End: 2024-12-05
Payer: MEDICARE

## 2024-12-05 VITALS
SYSTOLIC BLOOD PRESSURE: 131 MMHG | WEIGHT: 175.8 LBS | HEART RATE: 83 BPM | BODY MASS INDEX: 30.01 KG/M2 | DIASTOLIC BLOOD PRESSURE: 76 MMHG | HEIGHT: 64 IN

## 2024-12-05 DIAGNOSIS — R33.9 URINARY RETENTION WITH INCOMPLETE BLADDER EMPTYING: ICD-10-CM

## 2024-12-05 DIAGNOSIS — N39.0 RECURRENT UTI (URINARY TRACT INFECTION): Primary | ICD-10-CM

## 2024-12-05 LAB
POC APPEARANCE, URINE: CLEAR
POC BILIRUBIN, URINE: NEGATIVE
POC BLOOD, URINE: ABNORMAL
POC COLOR, URINE: YELLOW
POC GLUCOSE, URINE: NEGATIVE MG/DL
POC KETONES, URINE: NEGATIVE MG/DL
POC LEUKOCYTES, URINE: ABNORMAL
POC NITRITE,URINE: NEGATIVE
POC PH, URINE: 6 PH
POC PROTEIN, URINE: NEGATIVE MG/DL
POC SPECIFIC GRAVITY, URINE: 1.01
POC UROBILINOGEN, URINE: 0.2 EU/DL

## 2024-12-05 PROCEDURE — 81003 URINALYSIS AUTO W/O SCOPE: CPT | Performed by: OBSTETRICS & GYNECOLOGY

## 2024-12-05 PROCEDURE — 51701 INSERT BLADDER CATHETER: CPT | Performed by: OBSTETRICS & GYNECOLOGY

## 2024-12-05 PROCEDURE — 1159F MED LIST DOCD IN RCRD: CPT | Performed by: OBSTETRICS & GYNECOLOGY

## 2024-12-05 PROCEDURE — 1157F ADVNC CARE PLAN IN RCRD: CPT | Performed by: OBSTETRICS & GYNECOLOGY

## 2024-12-05 PROCEDURE — 99214 OFFICE O/P EST MOD 30 MIN: CPT | Performed by: OBSTETRICS & GYNECOLOGY

## 2024-12-05 PROCEDURE — 1126F AMNT PAIN NOTED NONE PRSNT: CPT | Performed by: OBSTETRICS & GYNECOLOGY

## 2024-12-05 ASSESSMENT — PAIN SCALES - GENERAL: PAINLEVEL_OUTOF10: 0-NO PAIN

## 2024-12-05 NOTE — LETTER
December 6, 2024     Carmela Elise DO  [address]  [facility], [address]  [facility] [address]    Patient: Valery Kiser   YOB: 1945   Date of Visit: 12/5/2024       Dear Dr. Carmela Elise DO:    Thank you for referring Valery Kiser to me for evaluation. Below are my notes for this consultation.  If you have questions, please do not hesitate to call me. I look forward to following your patient along with you.       Sincerely,     Kim Salgado MD      CC: No Recipients  ______________________________________________________________________________________    Urogynecology  Provider:  Kim Salgado MD  [phone]        ASSESSMENT AND PLAN:   79 y.o. female with hx of OAB and Bree. Co morbidities: HLD, Hypothyroidism, GERD.     Diagnoses:   #1 OAB       Plan:   1. OAB, urinary retention   - PVR = 345 ml's by U/S.   - PVR = 450 ml's by s/c.   - Urine dip is negative.   - She will stop the Solifenacin due to urinary retention.   - Patient will RTC in 2-3 weeks for a PVR check. At that time, we can consider starting Trospium 20 mg 1/2 tablet BID or Myrbetriq.   - She is amenable to this. All questions answered. Discussed with the patient the importance of managing urinary retention aggressively and possible associated complications of not emptying bladder.     Follow-up in 2-3 weeks with Vanesa STERLING for a PVR.     Scribe Attestation:   I, Emilia Woods, am scribing for virtually, and in the presence of Kim Salgado MD on 12/05/2024 at 4:48 PM.     Agree with above. I Dr. Salgado, personally performed the services described in the documentation which was scribed virtually and confirm it is both complete and accurate.  Kim Salgado MD      Problem List Items Addressed This Visit    None          I spent a total of eConsult Time: 30 minutes in face to face and non face to face time.        Kim Salgado MD        HISTORY OF PRESENT  ILLNESS:   Valery Kiser is a 79 y.o. female who presents for follow up on OAB.     Record Review:   Saw Arnoldo 7/2023  Plan:  - She wishes to continue her regimen of solifenacin 5mg daily, estrogen cream 0.1mg bi-weekly, and Bactrim 400/80 daily to prevent UTI and manage OAB/vaginal atrophy symptoms.   - Fu in 1 year or prn.      Urinary Symptoms:   - She is on Solifenacin 5 mg daily.   - Pt felt like she was able to empty her bladder only a small amount today. She hasn't drank much fluid today.   - She reports the Solifenacin is working well.   - Denies having urine leakage recently.        Past Medical History:      Past Medical History:   Diagnosis Date   • Bladder disorder, unspecified     Bladder disorder   • Contusion of unspecified part of head, initial encounter 07/14/2016    Contusion of head, unspecified part of head, initial encounter   • Other abnormal findings in urine     High urine leukocyte count   • Other fecal abnormalities     Occult blood positive stool   • Other microscopic hematuria 07/07/2020    Other microscopic hematuria   • Personal history of other diseases of the digestive system 07/07/2020    History of gastritis   • Personal history of other diseases of the musculoskeletal system and connective tissue     History of arthritis   • Personal history of other drug therapy     COVID-19 vaccine series completed   • Personal history of other medical treatment 07/07/2020    History of screening mammography   • Personal history of other specified conditions 06/01/2016    History of nausea and vomiting   • Post-traumatic headache, unspecified, not intractable 07/14/2016    Post-concussion headache   • Unspecified macular degeneration     Macular degeneration of right eye, unspecified type   • Urinary tract infection, site not specified 07/07/2020    Acute lower UTI   • Urinary tract infection, site not specified 11/24/2015    Acute UTI          Past Surgical History:       Past Surgical History:    Procedure Laterality Date   • APPENDECTOMY  02/21/2017    Appendectomy   • BASAL CELL CARCINOMA EXCISION Right 07/18/2023    right shoulder   • BELT ABDOMINOPLASTY  03/31/2014    Abdominoplasty   • DILATION AND CURETTAGE OF UTERUS  03/31/2014    Dilation And Curettage   • INCISIONAL BREAST BIOPSY  03/31/2014    Incisional Breast Biopsy   • OTHER SURGICAL HISTORY  03/31/2014    Laparoscopy (Diagnostic) Appendix   • OTHER SURGICAL HISTORY  03/31/2014    Cholecystotomy   • OTHER SURGICAL HISTORY  11/11/2021    Tonsillectomy with adenoidectomy   • VARICOSE VEIN SURGERY  03/31/2014    Varicose Vein Ligation         Medications:       Prior to Admission medications    Medication Sig Start Date End Date Taking? Authorizing Provider   aflibercept (Eylea) 2 mg/0.05 mL intra-ocular injection Administer 1 mg into the right eye every 30 (thirty) days.    Historical Provider, MD   alendronate (Fosamax) 70 mg tablet TAKE 1 TABLET BY MOUTH ONE TIME PER WEEK 8/11/24   Carmela Elise DO   atorvastatin (Lipitor) 20 mg tablet Take 1 tablet (20 mg) by mouth once daily at bedtime. 9/9/24   Carmela Elise DO   busPIRone (Buspar) 5 mg tablet Take 1 tablet (5 mg) by mouth 2 times a day. 9/9/24   Carmela Elise DO   cholecalciferol (Vitamin D-3) 5,000 Units tablet Take 1 tablet (5,000 Units) by mouth once daily. 3/31/14   Historical Provider, MD   cyanocobalamin (Vitamin B-12) 1,000 mcg tablet Take by mouth. 9/6/19   Historical Provider, MD   dexlansoprazole (Dexilant) 60 mg DR capsule Take 1 capsule (60 mg) by mouth once daily. 8/5/24   Frederick Hylton MD   erythromycin (Romycin) 5 mg/gram (0.5 %) ophthalmic ointment Apply to affected eye(s). 12/3/21   Historical Provider, MD   estradiol (Estrace) 0.1 MG/GM vaginal cream APPLY THE AMOUNT OF CREAM DIRECTED BY DOCTOR TO THE URETHRAL ORIFICE TWICE A WEEK    Historical Provider, MD   fexofenadine (Allegra Allergy) 180 mg tablet Take by mouth. 8/17/15   Historical Provider, MD  "  FLUoxetine (PROzac) 20 mg capsule Take 2 capsules (40 mg) by mouth once daily. 9/9/24   Carmela Elise DO   fluticasone (Flonase) 50 mcg/actuation nasal spray Administer into affected nostril(s).    Historical Provider, MD cartertfctyzkp-ctpy-pjl0-C-parth-bosw (Osteo Bi-Flex Triple Strength) 750 mg-644 mg- 30 mg-1 mg tablet Take 1 tablet by mouth once daily. 11/18/15   Historical Provider, MD   levothyroxine (Synthroid, Levoxyl) 50 mcg tablet TAKE 1 TABLET BY MOUTH EVERY DAY 10/9/24   Carmela Elise DO   peg 400-propylene glycol 0.4-0.3 % drops Administer into affected eye(s). 12/3/21   Historical Provider, MD   solifenacin (VESIcare) 5 mg tablet TAKE 1 TABLET BY MOUTH EVERY DAY 11/11/24   HALLIE Blue   sulfamethoxazole-trimethoprim (Bactrim) 400-80 mg tablet Take 1 tablet by mouth once daily. 10/8/24   HALLIE Blue   vit C/E/zinc ox/edilia/lut/zeax (ICAPS AREDS2 ORAL) Take 2 tablets by mouth once daily.    Historical Provider, MD ZEPEDA  Review of Systems   All other systems reviewed and are negative.       No results found for: \"BLOODU\", \"NITRITEU\", \"UROBILINOGEN\"      PHYSICAL EXAM:      There were no vitals taken for this visit.     No LMP recorded.      Declines chaperone for physical exam.      Well developed, well nourished, in no apparent distress.   Neurologic/Psychiatric:  Awake, Alert and Oriented times 3.  Affect normal.     GENITAL/URINARY:       External Genitalia:  The patient has normal appearing external genitalia, normal skenes and bartholins glands, and a normal hair distribution.  Her vulva is without lesions, erythema or discharge.  It is non-tender with appropriate sensation.     Urethral Meatus:  Size normal, Location normal, Lesions absent, Prolapse absent,      Urethra:  Fullness absent, Masses absent,      Bladder:  Fullness absent, Masses absent, Tenderness absent,  No prolapse    Vagina:  General appearance normal, Discharge absent, Lesions absent,  "         POP-Q:  Position: sitting  No significant POP.     Aa: -1       Ba:  C:    Gh:  Pb:  TVL:          Ap:  Bp:  D:                Data and DIAGNOSTIC STUDIES REVIEWED   No results found.   Lab Results   Component Value Date    URINECULTURE No significant growth 01/17/2024      Lab Results   Component Value Date    GLUCOSE 99 09/10/2024    CALCIUM 8.9 09/10/2024     09/10/2024    K 4.2 09/10/2024    CO2 28 09/10/2024     09/10/2024    BUN 17 09/10/2024    CREATININE 0.95 09/10/2024     Lab Results   Component Value Date    WBC 7.8 09/10/2024    HGB 13.4 09/10/2024    HCT 40.2 09/10/2024    MCV 94 09/10/2024     09/10/2024          Kim Salgado MD

## 2024-12-16 ENCOUNTER — APPOINTMENT (OUTPATIENT)
Dept: OBSTETRICS AND GYNECOLOGY | Facility: CLINIC | Age: 79
End: 2024-12-16
Payer: MEDICARE

## 2024-12-16 VITALS — DIASTOLIC BLOOD PRESSURE: 76 MMHG | SYSTOLIC BLOOD PRESSURE: 130 MMHG | BODY MASS INDEX: 29.52 KG/M2 | WEIGHT: 172 LBS

## 2024-12-16 DIAGNOSIS — R33.9 URINARY RETENTION WITH INCOMPLETE BLADDER EMPTYING: ICD-10-CM

## 2024-12-16 PROCEDURE — 1157F ADVNC CARE PLAN IN RCRD: CPT | Performed by: NURSE PRACTITIONER

## 2024-12-16 PROCEDURE — 99212 OFFICE O/P EST SF 10 MIN: CPT | Performed by: NURSE PRACTITIONER

## 2024-12-16 PROCEDURE — 1126F AMNT PAIN NOTED NONE PRSNT: CPT | Performed by: NURSE PRACTITIONER

## 2024-12-16 PROCEDURE — 1159F MED LIST DOCD IN RCRD: CPT | Performed by: NURSE PRACTITIONER

## 2024-12-16 PROCEDURE — 51798 US URINE CAPACITY MEASURE: CPT | Performed by: NURSE PRACTITIONER

## 2024-12-16 ASSESSMENT — ENCOUNTER SYMPTOMS
ALLERGIC/IMMUNOLOGIC NEGATIVE: 1
RESPIRATORY NEGATIVE: 1
CONSTITUTIONAL NEGATIVE: 1
MUSCULOSKELETAL NEGATIVE: 1
ENDOCRINE NEGATIVE: 1
HEMATOLOGIC/LYMPHATIC NEGATIVE: 1
NEUROLOGICAL NEGATIVE: 1
GASTROINTESTINAL NEGATIVE: 1
EYES NEGATIVE: 1
PSYCHIATRIC NEGATIVE: 1

## 2024-12-16 ASSESSMENT — PAIN SCALES - GENERAL: PAINLEVEL_OUTOF10: 0-NO PAIN

## 2024-12-16 NOTE — PROGRESS NOTES
Valery Kiser had a discussion of her overactive bladder. Recently discontinued Solifenacin 5 mg, which had caused urinary retention. Reports an improvement in bladder function and the ability to void when needed. Prefers to not start a new medication at this time.     Reports ankle swelling, inquires about the possible cause. Elevating her feet and cold therapy do not improve swelling. Does not currently see a cardiologist. Has discussed the issue with PCP, who recommended compression stockings.      PVR: 54 ml       Review of Systems   Constitutional: Negative.    HENT: Negative.     Eyes: Negative.    Respiratory: Negative.     Cardiovascular:  Positive for leg swelling.   Gastrointestinal: Negative.    Endocrine: Negative.    Genitourinary: Negative.    Musculoskeletal: Negative.    Skin: Negative.    Allergic/Immunologic: Negative.    Neurological: Negative.    Hematological: Negative.    Psychiatric/Behavioral: Negative.            Physical Exam  Constitutional:       Appearance: Normal appearance.   HENT:      Head: Normocephalic and atraumatic.   Neurological:      General: No focal deficit present.      Mental Status: She is alert and oriented to person, place, and time.   Psychiatric:         Mood and Affect: Mood normal.         Behavior: Behavior normal.         Thought Content: Thought content normal.         Judgment: Judgment normal.          Discussion  Counseling regarding anti-cholinergic medication: This patient has been prescribed or is currently on an anti-cholinergic medication for treatment of overactive bladder/urge urinary incontinence. The patient was screened for contraindications to use. We reviewed indication for use, potential common side effects, and instructions for use. We reviewed association of anti-cholinergic medication use with increased risk of developing dementia. Alternatives to anti-cholinergic medication and different anti-cholinergic medications were discussed, including  theoretical decreased risk with certain anti-cholinergics, and offered as appropriate for the patient. The patient expressed her understanding of the counseling provided. The patient's expressed preference is:? Discontinue all medication for OAB/UUI. Has been on OAB med in the past Yes how many1-2 Solifenacin 5 mg       Assesment and Plan  79 y.o. female with urinary retention with incomplete bladder emptying. Comorbidities include: HLD, GERD, Hypothyroidism, Osteopenia.     Diagnosis List:  #1 Urinary retention with incomplete bladder emptying    Plan:  1. Urinary retention with incomplete bladder emptying  - PVR performed, 54 ml, retention resolved.   - Continue to monitor bladder function, patient preference to avoid medication at this time.     - Plan to discuss ankle swelling with PCP for further evaluation.       Follow up as needed with HALLIE Blue.    Scribe Attestation  By signing my name below, Meghann MCDONNELL Scribe, attest that this documentation has been prepared under the direction and in the presence of HALLIE Blue on 12/16/2024 at 2:17 PM.     I, HALLIE Blue, personally performed the services described in this documentation which was scribed virtually and I confirm that it is both accurate and complete.     HALLIE Blue

## 2025-01-09 ENCOUNTER — TELEPHONE (OUTPATIENT)
Dept: OBSTETRICS AND GYNECOLOGY | Facility: CLINIC | Age: 80
End: 2025-01-09
Payer: MEDICARE

## 2025-01-09 DIAGNOSIS — N39.0 RECURRENT UTI (URINARY TRACT INFECTION): ICD-10-CM

## 2025-01-09 RX ORDER — SULFAMETHOXAZOLE AND TRIMETHOPRIM 400; 80 MG/1; MG/1
1 TABLET ORAL DAILY
Qty: 90 TABLET | Refills: 3 | Status: SHIPPED | OUTPATIENT
Start: 2025-01-09

## 2025-01-09 NOTE — TELEPHONE ENCOUNTER
Request received for   Requested Prescriptions      No prescriptions requested or ordered in this encounter   SS Bactrim 1 tab daily for prevention of UTI    Valery Kiser was last seen 12/16/2024

## 2025-01-09 NOTE — TELEPHONE ENCOUNTER
Went to  the prescription and was told it was cancelled thinks there was a mix up on what was supposed to be cancelled and what wasn't

## 2025-02-03 ENCOUNTER — APPOINTMENT (OUTPATIENT)
Dept: GASTROENTEROLOGY | Facility: CLINIC | Age: 80
End: 2025-02-03
Payer: MEDICARE

## 2025-02-03 VITALS — HEART RATE: 85 BPM | WEIGHT: 170 LBS | BODY MASS INDEX: 30.12 KG/M2 | HEIGHT: 63 IN

## 2025-02-03 DIAGNOSIS — K44.9 HIATAL HERNIA: ICD-10-CM

## 2025-02-03 DIAGNOSIS — K21.9 GASTROESOPHAGEAL REFLUX DISEASE, UNSPECIFIED WHETHER ESOPHAGITIS PRESENT: Primary | ICD-10-CM

## 2025-02-03 PROCEDURE — 99214 OFFICE O/P EST MOD 30 MIN: CPT | Performed by: INTERNAL MEDICINE

## 2025-02-03 PROCEDURE — 1036F TOBACCO NON-USER: CPT | Performed by: INTERNAL MEDICINE

## 2025-02-03 PROCEDURE — 1159F MED LIST DOCD IN RCRD: CPT | Performed by: INTERNAL MEDICINE

## 2025-02-03 PROCEDURE — 1157F ADVNC CARE PLAN IN RCRD: CPT | Performed by: INTERNAL MEDICINE

## 2025-02-03 RX ORDER — DEXLANSOPRAZOLE 60 MG/1
1 CAPSULE, DELAYED RELEASE ORAL DAILY
Qty: 90 CAPSULE | Refills: 3 | Status: SHIPPED | OUTPATIENT
Start: 2025-02-03 | End: 2025-02-03

## 2025-02-03 RX ORDER — DEXLANSOPRAZOLE 60 MG/1
1 CAPSULE, DELAYED RELEASE ORAL DAILY
Qty: 90 CAPSULE | Refills: 3 | Status: SHIPPED | OUTPATIENT
Start: 2025-02-03

## 2025-02-03 ASSESSMENT — ENCOUNTER SYMPTOMS: SHORTNESS OF BREATH: 0

## 2025-02-03 NOTE — PATIENT INSTRUCTIONS
Continue Dexlansoprazole 60 mg taken 3 times per week. If you continue to do well, then we will consider cutting down to 30 mg dose in the future. Follow-up in the office in 1 year.

## 2025-02-03 NOTE — PROGRESS NOTES
REASON FOR VISIT:  GERD  PCP (requesting provider): Carmela Elise DO.    HPI:  Valery Kiser is a 79 y.o. female with a past medical history of HLD, hypothyroidism, anxiety, and depression being evaluated for GERD. Esophagram showed mild esophageal dysmotility and small sliding hiatal hernia (10/2019).     They report she had colonoscopy in 2011 and no polyps. She was told no repeat colonoscopy after last one. Reflux has been generally well-controlled. She is taking the Dexilant 3 times per week. Her last bad episode of reflux was back a few years ago at a wedding. Denies dysphagia or odynophagia. No blood in the stool. She had prior EGD in the past but it was remote. No regular NSAIDs. She takes Tylenol occasionally. She is on Fosamax for osteoporosis.      PSurgHx:  -Cholecystectomy   -Appendectomy     FamHx: No GI cancer     Prior Endoscopy:  -Colonoscopy (3/2017): Adequate prep, two diminutive rectal polyps (HP x 2), two small cecal AVMs, medium sized IH, otherwise normal colon.  -Colonoscopy (7/2005): Excellent prep, 6 mm sigmoid polyp (lymphoid aggregate and prolapse changes), otherwise normal colon.    PAST MEDICAL HISTORY  Past Medical History:   Diagnosis Date    Bladder disorder, unspecified     Bladder disorder    Contusion of unspecified part of head, initial encounter 07/14/2016    Contusion of head, unspecified part of head, initial encounter    Other abnormal findings in urine     High urine leukocyte count    Other fecal abnormalities     Occult blood positive stool    Other microscopic hematuria 07/07/2020    Other microscopic hematuria    Personal history of other diseases of the digestive system 07/07/2020    History of gastritis    Personal history of other diseases of the musculoskeletal system and connective tissue     History of arthritis    Personal history of other drug therapy     COVID-19 vaccine series completed    Personal history of other medical treatment 07/07/2020    History  Unable to reach the patient. Did schedule her tomorrow at 0810. Will call the patient again in the morning.    of screening mammography    Personal history of other specified conditions 06/01/2016    History of nausea and vomiting    Post-traumatic headache, unspecified, not intractable 07/14/2016    Post-concussion headache    Unspecified macular degeneration     Macular degeneration of right eye, unspecified type    Urinary tract infection, site not specified 07/07/2020    Acute lower UTI    Urinary tract infection, site not specified 11/24/2015    Acute UTI       PAST SURGICAL HISTORY  Past Surgical History:   Procedure Laterality Date    APPENDECTOMY  02/21/2017    Appendectomy    BASAL CELL CARCINOMA EXCISION Right 07/18/2023    right shoulder    BELT ABDOMINOPLASTY  03/31/2014    Abdominoplasty    DILATION AND CURETTAGE OF UTERUS  03/31/2014    Dilation And Curettage    INCISIONAL BREAST BIOPSY  03/31/2014    Incisional Breast Biopsy    OTHER SURGICAL HISTORY  03/31/2014    Laparoscopy (Diagnostic) Appendix    OTHER SURGICAL HISTORY  03/31/2014    Cholecystotomy    OTHER SURGICAL HISTORY  11/11/2021    Tonsillectomy with adenoidectomy    VARICOSE VEIN SURGERY  03/31/2014    Varicose Vein Ligation       FAMILY HISTORY  Family History   Problem Relation Name Age of Onset    Other (hepatic cirrhosis) Mother      Hypertension Father      Other (CVA) Father      Multiple sclerosis Brother      Multiple sclerosis Daughter         SOCIAL HISTORY   reports that she quit smoking about 43 years ago. Her smoking use included cigarettes. She started smoking about 68 years ago. She has a 50 pack-year smoking history. She has been exposed to tobacco smoke. She has never used smokeless tobacco. She reports that she does not drink alcohol and does not use drugs.    REVIEW OF SYSTEMS  Review of Systems   Respiratory:  Negative for shortness of breath.    Cardiovascular:  Negative for chest pain.   All other systems reviewed and are negative.    A 10+ point review of systems was otherwise negative except as noted and per  HPI.    ALLERGIES  Allergies   Allergen Reactions    Latex Other       MEDICATIONS  Current Outpatient Medications   Medication Instructions    aflibercept (EYLEA) 1 mg, Every 30 days    alendronate (FOSAMAX) 70 mg, oral, Once Weekly    atorvastatin (LIPITOR) 20 mg, oral, Nightly    busPIRone (BUSPAR) 5 mg, oral, 2 times daily    cholecalciferol (Vitamin D-3) 5,000 Units tablet 1 tablet, Daily    cyanocobalamin (Vitamin B-12) 1,000 mcg tablet Take by mouth.    dexlansoprazole (DEXILANT) 60 mg, oral, Daily    estradiol (Estrace) 0.1 MG/GM vaginal cream APPLY THE AMOUNT OF CREAM DIRECTED BY DOCTOR TO THE URETHRAL ORIFICE TWICE A WEEK    fexofenadine (Allegra Allergy) 180 mg tablet Take by mouth.    FLUoxetine (PROZAC) 40 mg, oral, Daily    fluticasone (Flonase) 50 mcg/actuation nasal spray Administer into affected nostril(s).    evcikcvr-sgah-lkx2-C-parth-bosw (Osteo Bi-Flex Triple Strength) 750 mg-644 mg- 30 mg-1 mg tablet 1 tablet, Daily    levothyroxine (SYNTHROID, LEVOXYL) 50 mcg, oral, Daily    peg 400-propylene glycol 0.4-0.3 % drops Administer into affected eye(s).    sulfamethoxazole-trimethoprim (Bactrim) 400-80 mg tablet 1 tablet, oral, Daily    vit C/E/zinc ox/edilia/lut/zeax (ICAPS AREDS2 ORAL) 2 tablets, Daily       VITALS  Vitals:    02/03/25 0954   Pulse: 85      Body mass index is 30.11 kg/m².    PHYSICAL EXAM  CONSTITUTIONAL: NAD, appears stated age  EYES: anicteric sclera, sclera clear  HEAD: normocephalic, atraumatic   NECK: supple   PULMONARY: CTAB  CARDIOVASCULAR: RRR, no M/R/G appreciated   ABDOMEN: soft, NTND, +BS, no rebound or guarding   MUSCULOSKELETAL: no edema  SKIN: no jaundice   PSYCHIATRIC: AOx3, appropriate insight and judgement    LABS  WBC   Date Value   09/10/2024 7.8 x10*3/uL   06/02/2023 7.8 x10E9/L   09/14/2022 9.1 x10E9/L   08/23/2021 8.9 x10E9/L     Hemoglobin (g/dL)   Date Value   09/10/2024 13.4   06/02/2023 12.2   09/14/2022 13.1   08/23/2021 14.0     Platelets   Date Value  "  09/10/2024 290 x10*3/uL   06/02/2023 278 x10E9/L   09/14/2022 347 x10E9/L   08/23/2021 347 x10E9/L     Sodium (mmol/L)   Date Value   09/10/2024 140   10/12/2023 141   06/02/2023 140   09/14/2022 139   08/23/2021 141     Potassium (mmol/L)   Date Value   09/10/2024 4.2   10/12/2023 4.2   06/02/2023 3.7   09/14/2022 4.3   08/23/2021 3.9     Chloride (mmol/L)   Date Value   09/10/2024 105   10/12/2023 107   06/02/2023 107   09/14/2022 105   08/23/2021 104     Bicarbonate (mmol/L)   Date Value   09/10/2024 28   10/12/2023 28   06/02/2023 25   09/14/2022 27   08/23/2021 28     Urea Nitrogen (mg/dL)   Date Value   09/10/2024 17   10/12/2023 14   06/02/2023 15   09/14/2022 14   08/23/2021 13     Creatinine (mg/dL)   Date Value   09/10/2024 0.95   10/12/2023 0.96   06/02/2023 0.93   09/14/2022 0.98   08/23/2021 0.96     Calcium (mg/dL)   Date Value   09/10/2024 8.9   10/12/2023 8.8   06/02/2023 8.7   09/14/2022 8.9   08/23/2021 8.7     Total Protein (g/dL)   Date Value   09/10/2024 6.4   10/12/2023 6.0 (L)   06/02/2023 6.3 (L)   09/14/2022 6.9   08/23/2021 6.3 (L)     Bilirubin, Total (mg/dL)   Date Value   09/10/2024 0.7   10/12/2023 0.7     Total Bilirubin (mg/dL)   Date Value   06/02/2023 0.5   09/14/2022 0.8   08/23/2021 0.7     Alkaline Phosphatase (U/L)   Date Value   09/10/2024 57   10/12/2023 74   06/02/2023 75   09/14/2022 72   08/23/2021 69     ALT (U/L)   Date Value   09/10/2024 10   10/12/2023 13     ALT (SGPT) (U/L)   Date Value   06/02/2023 11   09/14/2022 11   08/23/2021 18     AST (U/L)   Date Value   09/10/2024 10   10/12/2023 11   06/02/2023 12   09/14/2022 11   08/23/2021 12     Glucose (mg/dL)   Date Value   09/10/2024 99   10/12/2023 91   06/02/2023 115 (H)   09/14/2022 98   08/23/2021 101 (H)     No results found for: \"LIPASE\", \"CRP\"    ASSESSMENT/PLAN  Valery Kiser is a 79 y.o. female with a past medical history of HLD, hypothyroidism, anxiety, and depression being evaluated for GERD. Esophagram " showed mild esophageal dysmotility and small sliding hiatal hernia (10/2019). GERD well-controlled on low dose of Dexilant 60 mg taken just three times per week. She does have osteoporosis so we would like to minimize the dose of PPI as much as possible in the future. She does have a small hiatal hernia which is likely contributing to her GERD.    -Continue Dexlansoprazole 60 mg taken 3 times per week (consider cutting down to 30 mg dose in the future assuming she continues doing well)    Follow-up in the office in 1 year.    Signature: Frederick Hylton MD

## 2025-02-20 ENCOUNTER — TELEPHONE (OUTPATIENT)
Dept: PRIMARY CARE | Facility: CLINIC | Age: 80
End: 2025-02-20
Payer: MEDICARE

## 2025-02-20 NOTE — TELEPHONE ENCOUNTER
Pt's  called to state they would like an other for Physical Therapy for her back out at [place], stated that she has been to the Chiropractor and the ablation did not work?  Very hard to move and has weakness.    Please review and advise

## 2025-02-21 DIAGNOSIS — R26.89 BALANCE PROBLEMS: ICD-10-CM

## 2025-02-21 DIAGNOSIS — G89.29 CHRONIC MIDLINE LOW BACK PAIN WITHOUT SCIATICA: ICD-10-CM

## 2025-02-21 DIAGNOSIS — M54.50 CHRONIC MIDLINE LOW BACK PAIN WITHOUT SCIATICA: ICD-10-CM

## 2025-02-21 DIAGNOSIS — M47.816 SPONDYLOSIS OF LUMBAR REGION WITHOUT MYELOPATHY OR RADICULOPATHY: ICD-10-CM

## 2025-04-04 ENCOUNTER — APPOINTMENT (OUTPATIENT)
Dept: PRIMARY CARE | Facility: CLINIC | Age: 80
End: 2025-04-04
Payer: MEDICARE

## 2025-04-04 VITALS
WEIGHT: 175.6 LBS | DIASTOLIC BLOOD PRESSURE: 80 MMHG | SYSTOLIC BLOOD PRESSURE: 120 MMHG | OXYGEN SATURATION: 97 % | HEIGHT: 65 IN | BODY MASS INDEX: 29.26 KG/M2 | TEMPERATURE: 97.8 F | HEART RATE: 64 BPM

## 2025-04-04 DIAGNOSIS — F41.9 ANXIETY AND DEPRESSION: ICD-10-CM

## 2025-04-04 DIAGNOSIS — R41.3 MEMORY CHANGES: Primary | ICD-10-CM

## 2025-04-04 DIAGNOSIS — F32.A ANXIETY AND DEPRESSION: ICD-10-CM

## 2025-04-04 PROCEDURE — 1036F TOBACCO NON-USER: CPT | Performed by: STUDENT IN AN ORGANIZED HEALTH CARE EDUCATION/TRAINING PROGRAM

## 2025-04-04 PROCEDURE — 1159F MED LIST DOCD IN RCRD: CPT | Performed by: STUDENT IN AN ORGANIZED HEALTH CARE EDUCATION/TRAINING PROGRAM

## 2025-04-04 PROCEDURE — 99214 OFFICE O/P EST MOD 30 MIN: CPT | Performed by: STUDENT IN AN ORGANIZED HEALTH CARE EDUCATION/TRAINING PROGRAM

## 2025-04-04 PROCEDURE — 1160F RVW MEDS BY RX/DR IN RCRD: CPT | Performed by: STUDENT IN AN ORGANIZED HEALTH CARE EDUCATION/TRAINING PROGRAM

## 2025-04-04 PROCEDURE — 1157F ADVNC CARE PLAN IN RCRD: CPT | Performed by: STUDENT IN AN ORGANIZED HEALTH CARE EDUCATION/TRAINING PROGRAM

## 2025-04-04 RX ORDER — ERYTHROMYCIN 5 MG/G
OINTMENT OPHTHALMIC
COMMUNITY

## 2025-04-04 RX ORDER — BUSPIRONE HYDROCHLORIDE 7.5 MG/1
7.5 TABLET ORAL 2 TIMES DAILY
Qty: 180 TABLET | Refills: 0 | Status: SHIPPED | OUTPATIENT
Start: 2025-04-04

## 2025-04-04 ASSESSMENT — ENCOUNTER SYMPTOMS
DYSPHORIC MOOD: 0
DYSURIA: 0
FEVER: 0
NAUSEA: 0
DIZZINESS: 0
CHILLS: 0
CONSTIPATION: 0
FREQUENCY: 0
COUGH: 0
NERVOUS/ANXIOUS: 1
HEADACHES: 0
ABDOMINAL PAIN: 0
FATIGUE: 0
SHORTNESS OF BREATH: 0
OCCASIONAL FEELINGS OF UNSTEADINESS: 1
HEMATURIA: 0
NUMBNESS: 0
DIARRHEA: 0
PALPITATIONS: 0
DEPRESSION: 1
WHEEZING: 0

## 2025-04-04 ASSESSMENT — ANXIETY QUESTIONNAIRES
7. FEELING AFRAID AS IF SOMETHING AWFUL MIGHT HAPPEN: SEVERAL DAYS
2. NOT BEING ABLE TO STOP OR CONTROL WORRYING: SEVERAL DAYS
6. BECOMING EASILY ANNOYED OR IRRITABLE: MORE THAN HALF THE DAYS
IF YOU CHECKED OFF ANY PROBLEMS ON THIS QUESTIONNAIRE, HOW DIFFICULT HAVE THESE PROBLEMS MADE IT FOR YOU TO DO YOUR WORK, TAKE CARE OF THINGS AT HOME, OR GET ALONG WITH OTHER PEOPLE: SOMEWHAT DIFFICULT
3. WORRYING TOO MUCH ABOUT DIFFERENT THINGS: MORE THAN HALF THE DAYS
4. TROUBLE RELAXING: NOT AT ALL
GAD7 TOTAL SCORE: 8
1. FEELING NERVOUS, ANXIOUS, OR ON EDGE: MORE THAN HALF THE DAYS
5. BEING SO RESTLESS THAT IT IS HARD TO SIT STILL: NOT AT ALL

## 2025-04-04 ASSESSMENT — PATIENT HEALTH QUESTIONNAIRE - PHQ9
10. IF YOU CHECKED OFF ANY PROBLEMS, HOW DIFFICULT HAVE THESE PROBLEMS MADE IT FOR YOU TO DO YOUR WORK, TAKE CARE OF THINGS AT HOME, OR GET ALONG WITH OTHER PEOPLE: SOMEWHAT DIFFICULT
6. FEELING BAD ABOUT YOURSELF - OR THAT YOU ARE A FAILURE OR HAVE LET YOURSELF OR YOUR FAMILY DOWN: SEVERAL DAYS
4. FEELING TIRED OR HAVING LITTLE ENERGY: SEVERAL DAYS
SUM OF ALL RESPONSES TO PHQ9 QUESTIONS 1 AND 2: 3
1. LITTLE INTEREST OR PLEASURE IN DOING THINGS: SEVERAL DAYS
8. MOVING OR SPEAKING SO SLOWLY THAT OTHER PEOPLE COULD HAVE NOTICED. OR THE OPPOSITE, BEING SO FIGETY OR RESTLESS THAT YOU HAVE BEEN MOVING AROUND A LOT MORE THAN USUAL: NOT AT ALL
7. TROUBLE CONCENTRATING ON THINGS, SUCH AS READING THE NEWSPAPER OR WATCHING TELEVISION: NOT AT ALL
5. POOR APPETITE OR OVEREATING: SEVERAL DAYS
3. TROUBLE FALLING OR STAYING ASLEEP OR SLEEPING TOO MUCH: NOT AT ALL
2. FEELING DOWN, DEPRESSED OR HOPELESS: MORE THAN HALF THE DAYS

## 2025-04-04 NOTE — PROGRESS NOTES
Subjective   Valery Kiser is a 79 y.o. female here for evaluation of memory problems x 3 years, worsening. She is accompanied by . Patient lives with their spouse.     The family and the patient identify problems with changes in short term memory, getting disoriented outside of familiar environment, and recalling words. Family and patient report problems with agitation. Medication administration: patient self medicates.    Functional Assessment:   Activities of Daily Living (ADLs):    She is independent in the following: ambulation, bathing and hygiene, feeding, continence, grooming, toileting, and dressing  Requires assistance with the following: none    Instrumental Activities of Daily Living (IADLs):    Patient is independent in the following: reading, shopping, telephoning, travel outside home, and writing  Requires assistance with the following:finances and housekeeping    She is doing better with her back. Seeing chiro and PT. Ablation didn't help    MMSE 28/30    Review of Systems   Constitutional:  Negative for chills, fatigue and fever.   Respiratory:  Negative for cough, shortness of breath and wheezing.    Cardiovascular:  Negative for chest pain, palpitations and leg swelling.   Gastrointestinal:  Negative for abdominal pain, constipation, diarrhea and nausea.   Genitourinary:  Negative for dysuria, frequency, hematuria and urgency.   Neurological:  Negative for dizziness, numbness and headaches.   Psychiatric/Behavioral:  Negative for dysphoric mood. The patient is nervous/anxious.         Objective   Physical Exam  Constitutional:       Appearance: Normal appearance.   HENT:      Head: Normocephalic and atraumatic.   Eyes:      Extraocular Movements: Extraocular movements intact.      Pupils: Pupils are equal, round, and reactive to light.   Cardiovascular:      Rate and Rhythm: Normal rate and regular rhythm.      Heart sounds: Normal heart sounds. No murmur heard.  Pulmonary:      Effort:  Pulmonary effort is normal.      Breath sounds: Normal breath sounds. No wheezing.   Musculoskeletal:         General: Normal range of motion.   Skin:     General: Skin is warm and dry.   Neurological:      General: No focal deficit present.      Mental Status: She is alert and oriented to person, place, and time.   Psychiatric:         Mood and Affect: Mood normal.         Behavior: Behavior normal.          Assessment/Plan   Problem List Items Addressed This Visit       Anxiety and depression    Relevant Medications    busPIRone (Buspar) 7.5 mg tablet     Other Visit Diagnoses       Memory changes    -  Primary    Relevant Orders    Referral to Adult Neuropsychology    Referral to Neurology            The following care plan was discussed with the patient and caregivers at length:   Management of neuropsychiatric symptoms.  Caregiver knowledge, needs, and questions.  Referral to neuropsychology and neurology for further evaluation.  Increasing dose of buspirone to 7.5 mg twice daily

## 2025-05-07 ENCOUNTER — HOSPITAL ENCOUNTER (OUTPATIENT)
Dept: RADIOLOGY | Facility: CLINIC | Age: 80
Discharge: HOME | End: 2025-05-07
Payer: MEDICARE

## 2025-05-07 DIAGNOSIS — R41.3 OTHER AMNESIA: ICD-10-CM

## 2025-05-07 PROCEDURE — 70551 MRI BRAIN STEM W/O DYE: CPT | Performed by: RADIOLOGY

## 2025-05-07 PROCEDURE — 70551 MRI BRAIN STEM W/O DYE: CPT

## 2025-06-25 ENCOUNTER — APPOINTMENT (OUTPATIENT)
Dept: PRIMARY CARE | Facility: CLINIC | Age: 80
End: 2025-06-25
Payer: MEDICARE

## 2025-06-25 VITALS
BODY MASS INDEX: 29.18 KG/M2 | SYSTOLIC BLOOD PRESSURE: 120 MMHG | OXYGEN SATURATION: 97 % | HEART RATE: 61 BPM | TEMPERATURE: 97.7 F | WEIGHT: 173 LBS | DIASTOLIC BLOOD PRESSURE: 80 MMHG

## 2025-06-25 DIAGNOSIS — F01.B0 MODERATE VASCULAR DEMENTIA, UNSPECIFIED WHETHER BEHAVIORAL, PSYCHOTIC, OR MOOD DISTURBANCE OR ANXIETY: ICD-10-CM

## 2025-06-25 DIAGNOSIS — E03.9 HYPOTHYROIDISM (ACQUIRED): ICD-10-CM

## 2025-06-25 DIAGNOSIS — E78.2 MIXED HYPERLIPIDEMIA: ICD-10-CM

## 2025-06-25 DIAGNOSIS — F32.A ANXIETY AND DEPRESSION: Primary | ICD-10-CM

## 2025-06-25 DIAGNOSIS — F41.9 ANXIETY AND DEPRESSION: Primary | ICD-10-CM

## 2025-06-25 DIAGNOSIS — Z13.6 ENCOUNTER FOR SCREENING FOR CORONARY ARTERY DISEASE: ICD-10-CM

## 2025-06-25 DIAGNOSIS — H35.3211 EXUDATIVE AGE-RELATED MACULAR DEGENERATION, RIGHT EYE, WITH ACTIVE CHOROIDAL NEOVASCULARIZATION: ICD-10-CM

## 2025-06-25 PROCEDURE — 99213 OFFICE O/P EST LOW 20 MIN: CPT | Performed by: STUDENT IN AN ORGANIZED HEALTH CARE EDUCATION/TRAINING PROGRAM

## 2025-06-25 PROCEDURE — 1158F ADVNC CARE PLAN TLK DOCD: CPT | Performed by: STUDENT IN AN ORGANIZED HEALTH CARE EDUCATION/TRAINING PROGRAM

## 2025-06-25 PROCEDURE — 1159F MED LIST DOCD IN RCRD: CPT | Performed by: STUDENT IN AN ORGANIZED HEALTH CARE EDUCATION/TRAINING PROGRAM

## 2025-06-25 PROCEDURE — 1036F TOBACCO NON-USER: CPT | Performed by: STUDENT IN AN ORGANIZED HEALTH CARE EDUCATION/TRAINING PROGRAM

## 2025-06-25 PROCEDURE — 1123F ACP DISCUSS/DSCN MKR DOCD: CPT | Performed by: STUDENT IN AN ORGANIZED HEALTH CARE EDUCATION/TRAINING PROGRAM

## 2025-06-25 PROCEDURE — G2211 COMPLEX E/M VISIT ADD ON: HCPCS | Performed by: STUDENT IN AN ORGANIZED HEALTH CARE EDUCATION/TRAINING PROGRAM

## 2025-06-25 RX ORDER — BUSPIRONE HYDROCHLORIDE 7.5 MG/1
7.5 TABLET ORAL 2 TIMES DAILY
Qty: 180 TABLET | Refills: 1 | Status: SHIPPED | OUTPATIENT
Start: 2025-06-25

## 2025-06-25 RX ORDER — DONEPEZIL HYDROCHLORIDE 5 MG/1
5 TABLET, FILM COATED ORAL EVERY 24 HOURS
COMMUNITY
Start: 2025-06-03

## 2025-06-25 ASSESSMENT — ENCOUNTER SYMPTOMS
FATIGUE: 0
OCCASIONAL FEELINGS OF UNSTEADINESS: 1
DIZZINESS: 0
COUGH: 0
HEMATURIA: 0
HEADACHES: 0
CONSTIPATION: 0
DEPRESSION: 1
CHILLS: 0
SHORTNESS OF BREATH: 0
DYSURIA: 0
FREQUENCY: 0
NUMBNESS: 0
NAUSEA: 0
DIARRHEA: 0
NERVOUS/ANXIOUS: 0
ABDOMINAL PAIN: 0
DYSPHORIC MOOD: 0
PALPITATIONS: 0
FEVER: 0
WHEEZING: 0

## 2025-06-25 ASSESSMENT — PATIENT HEALTH QUESTIONNAIRE - PHQ9
8. MOVING OR SPEAKING SO SLOWLY THAT OTHER PEOPLE COULD HAVE NOTICED. OR THE OPPOSITE, BEING SO FIGETY OR RESTLESS THAT YOU HAVE BEEN MOVING AROUND A LOT MORE THAN USUAL: NOT AT ALL
2. FEELING DOWN, DEPRESSED OR HOPELESS: MORE THAN HALF THE DAYS
3. TROUBLE FALLING OR STAYING ASLEEP OR SLEEPING TOO MUCH: NOT AT ALL
5. POOR APPETITE OR OVEREATING: NOT AT ALL
6. FEELING BAD ABOUT YOURSELF - OR THAT YOU ARE A FAILURE OR HAVE LET YOURSELF OR YOUR FAMILY DOWN: NOT AT ALL
1. LITTLE INTEREST OR PLEASURE IN DOING THINGS: SEVERAL DAYS
SUM OF ALL RESPONSES TO PHQ QUESTIONS 1-9: 4
9. THOUGHTS THAT YOU WOULD BE BETTER OFF DEAD, OR OF HURTING YOURSELF: NOT AT ALL
SUM OF ALL RESPONSES TO PHQ9 QUESTIONS 1 AND 2: 3
7. TROUBLE CONCENTRATING ON THINGS, SUCH AS READING THE NEWSPAPER OR WATCHING TELEVISION: NOT AT ALL
4. FEELING TIRED OR HAVING LITTLE ENERGY: SEVERAL DAYS

## 2025-06-25 ASSESSMENT — ANXIETY QUESTIONNAIRES
IF YOU CHECKED OFF ANY PROBLEMS ON THIS QUESTIONNAIRE, HOW DIFFICULT HAVE THESE PROBLEMS MADE IT FOR YOU TO DO YOUR WORK, TAKE CARE OF THINGS AT HOME, OR GET ALONG WITH OTHER PEOPLE: SOMEWHAT DIFFICULT
4. TROUBLE RELAXING: NOT AT ALL
7. FEELING AFRAID AS IF SOMETHING AWFUL MIGHT HAPPEN: SEVERAL DAYS
6. BECOMING EASILY ANNOYED OR IRRITABLE: MORE THAN HALF THE DAYS
GAD7 TOTAL SCORE: 11
5. BEING SO RESTLESS THAT IT IS HARD TO SIT STILL: NOT AT ALL
1. FEELING NERVOUS, ANXIOUS, OR ON EDGE: NEARLY EVERY DAY
2. NOT BEING ABLE TO STOP OR CONTROL WORRYING: MORE THAN HALF THE DAYS
3. WORRYING TOO MUCH ABOUT DIFFERENT THINGS: NEARLY EVERY DAY

## 2025-06-25 NOTE — PROGRESS NOTES
Subjective   Patient ID: Valery Kiser is a 79 y.o. female who presents for Follow-up.    HPI   Patient here to follow-up on increasing buspirone to 7.5 mg twice daily. She feels anxiety is much better controlled. Still gets anxious but it is more manageable than it was.   She was also referred to neurology for further evaluation on her memory issues.    She saw neuro for dementia. She has moderate vascular dementia and is on donepezil. She thinks she is having side effects of diaphoresis.   Does have to urinate frequently. Doesn't feel like a bladder infection.     Review of Systems   Constitutional:  Negative for chills, fatigue and fever.   Respiratory:  Negative for cough, shortness of breath and wheezing.    Cardiovascular:  Negative for chest pain, palpitations and leg swelling.   Gastrointestinal:  Negative for abdominal pain, constipation, diarrhea and nausea.   Genitourinary:  Negative for dysuria, frequency, hematuria and urgency.   Neurological:  Negative for dizziness, numbness and headaches.   Psychiatric/Behavioral:  Negative for dysphoric mood. The patient is not nervous/anxious.        Objective   /80 (BP Location: Right arm, Patient Position: Sitting, BP Cuff Size: Large adult)   Pulse 61   Temp 36.5 °C (97.7 °F) (Temporal)   Wt 78.5 kg (173 lb)   SpO2 97%   BMI 29.18 kg/m²     Physical Exam  Constitutional:       Appearance: Normal appearance.   HENT:      Head: Normocephalic and atraumatic.   Eyes:      Extraocular Movements: Extraocular movements intact.      Pupils: Pupils are equal, round, and reactive to light.   Cardiovascular:      Rate and Rhythm: Normal rate and regular rhythm.      Heart sounds: Normal heart sounds. No murmur heard.  Pulmonary:      Effort: Pulmonary effort is normal.      Breath sounds: Normal breath sounds. No wheezing.   Musculoskeletal:         General: Normal range of motion.   Skin:     General: Skin is warm and dry.   Neurological:      General: No  focal deficit present.      Mental Status: She is alert and oriented to person, place, and time.   Psychiatric:         Mood and Affect: Mood normal.         Behavior: Behavior normal.         Assessment/Plan   Problem List Items Addressed This Visit       Hyperlipidemia    Relevant Orders    Lipid Panel    Comprehensive Metabolic Panel    CBC    Hypothyroidism (acquired)    Relevant Orders    TSH    Anxiety and depression - Primary    Relevant Medications    busPIRone (Buspar) 7.5 mg tablet    Exudative age-related macular degeneration, right eye, with active choroidal neovascularization    Moderate vascular dementia, unspecified whether behavioral, psychotic, or mood disturbance or anxiety     Other Visit Diagnoses         Encounter for screening for coronary artery disease        Relevant Orders    Lipid Panel          Doing well on buspirone, will continue that  She is following with neurology and is actually feeling pretty good about her diagnosis and plan  Will see her back in September for her physical with fasting blood work prior      Carmela Elise, DO  6/25/2025

## 2025-07-11 ENCOUNTER — TELEPHONE (OUTPATIENT)
Dept: PRIMARY CARE | Facility: CLINIC | Age: 80
End: 2025-07-11
Payer: MEDICARE

## 2025-07-11 NOTE — TELEPHONE ENCOUNTER
PT'S  CALLED ASKING IF THERE IS SOMETHING THAT CAN BE CALLED IN FOR PT TO HELP WITH RESTLESS LEG SYNDROME, SINCE THEY WERE JUST IN, PER   DEE OVER THE OVER MEDICATIONS ARE NOT HELPING     PT NOTIFIED OUT OF OFFICE UNTIL 7/15/25    PLEASE REVIEW AND ADVISE     [facility] [phone]   Delon Gates (Attending)

## 2025-08-02 DIAGNOSIS — F32.4 MAJOR DEPRESSIVE DISORDER WITH SINGLE EPISODE, IN PARTIAL REMISSION: ICD-10-CM

## 2025-08-04 RX ORDER — FLUOXETINE 20 MG/1
40 CAPSULE ORAL DAILY
Qty: 180 CAPSULE | Refills: 1 | Status: SHIPPED | OUTPATIENT
Start: 2025-08-04

## 2025-09-10 ENCOUNTER — APPOINTMENT (OUTPATIENT)
Dept: PRIMARY CARE | Facility: CLINIC | Age: 80
End: 2025-09-10
Payer: MEDICARE